# Patient Record
Sex: FEMALE | Race: WHITE | NOT HISPANIC OR LATINO | Employment: PART TIME | RURAL
[De-identification: names, ages, dates, MRNs, and addresses within clinical notes are randomized per-mention and may not be internally consistent; named-entity substitution may affect disease eponyms.]

---

## 2022-08-23 ENCOUNTER — OFFICE VISIT (OUTPATIENT)
Dept: FAMILY MEDICINE CLINIC | Facility: CLINIC | Age: 22
End: 2022-08-23

## 2022-08-23 VITALS
BODY MASS INDEX: 26.29 KG/M2 | SYSTOLIC BLOOD PRESSURE: 138 MMHG | WEIGHT: 154 LBS | TEMPERATURE: 98.4 F | HEIGHT: 64 IN | DIASTOLIC BLOOD PRESSURE: 70 MMHG | OXYGEN SATURATION: 98 % | HEART RATE: 92 BPM

## 2022-08-23 DIAGNOSIS — Z11.3 SCREENING EXAMINATION FOR STD (SEXUALLY TRANSMITTED DISEASE): ICD-10-CM

## 2022-08-23 DIAGNOSIS — R30.0 DYSURIA: Primary | ICD-10-CM

## 2022-08-23 LAB
B-HCG UR QL: NEGATIVE
BILIRUB BLD-MCNC: ABNORMAL MG/DL
CLARITY, POC: ABNORMAL
COLOR UR: ABNORMAL
EXPIRATION DATE: ABNORMAL
EXPIRATION DATE: NORMAL
GLUCOSE UR STRIP-MCNC: NEGATIVE MG/DL
INTERNAL NEGATIVE CONTROL: NORMAL
INTERNAL POSITIVE CONTROL: NORMAL
KETONES UR QL: NEGATIVE
LEUKOCYTE EST, POC: ABNORMAL
Lab: ABNORMAL
Lab: NORMAL
NITRITE UR-MCNC: NEGATIVE MG/ML
PH UR: 6 [PH] (ref 5–8)
PROT UR STRIP-MCNC: ABNORMAL MG/DL
RBC # UR STRIP: ABNORMAL /UL
SP GR UR: 1.02 (ref 1–1.03)
UROBILINOGEN UR QL: ABNORMAL

## 2022-08-23 PROCEDURE — 81025 URINE PREGNANCY TEST: CPT | Performed by: PHYSICIAN ASSISTANT

## 2022-08-23 PROCEDURE — 81003 URINALYSIS AUTO W/O SCOPE: CPT | Performed by: PHYSICIAN ASSISTANT

## 2022-08-23 PROCEDURE — 99213 OFFICE O/P EST LOW 20 MIN: CPT | Performed by: PHYSICIAN ASSISTANT

## 2022-08-23 RX ORDER — CEFDINIR 300 MG/1
300 CAPSULE ORAL 2 TIMES DAILY
Qty: 14 CAPSULE | Refills: 0 | Status: SHIPPED | OUTPATIENT
Start: 2022-08-23 | End: 2022-08-30

## 2022-08-23 RX ORDER — SULFAMETHOXAZOLE AND TRIMETHOPRIM 800; 160 MG/1; MG/1
1 TABLET ORAL 2 TIMES DAILY
COMMUNITY
Start: 2022-08-19 | End: 2022-08-23 | Stop reason: ALTCHOICE

## 2022-08-23 RX ORDER — DROSPIRENONE AND ETHINYL ESTRADIOL 0.02-3(28)
1 KIT ORAL DAILY
COMMUNITY
Start: 2022-06-09

## 2022-08-23 NOTE — PROGRESS NOTES
"Chief Complaint  Urinary Tract Infection (Had a UTI since last Tuesday hurts when urinates gets dizzy and having back pain went to [facility] on Friday got antibiotic bactrim but symptoms are not getting better)    Subjective          Thelma Lau presents to [facility]  Patient in today for evaluation on dysuria that she has had for the last 4 to 5 days.  She did go to the urgent care and was started on Bactrim for a urinary tract infection.  Yesterday was having some mild nausea, no vomiting or diarrhea.  States also off-and-on has had some dizziness and lower back pain as well.  States her last menstrual cycle was 2 weeks ago and normal.    Urinary Tract Infection   The current episode started in the past 7 days. The problem has been waxing and waning. There has been no fever. Associated symptoms include nausea. Pertinent negatives include no flank pain, frequency, hematuria or vomiting.       Objective   Vital Signs:   /70 (BP Location: Left arm, Patient Position: Sitting, Cuff Size: Adult)   Pulse 92   Temp 98.4 °F (36.9 °C) (Oral)   Ht 162.6 cm (64\")   Wt 69.9 kg (154 lb)   SpO2 98%   BMI 26.43 kg/m²     Body mass index is 26.43 kg/m².    Review of Systems   Constitutional: Negative for fatigue and fever.   HENT: Negative for sore throat.    Cardiovascular: Negative for chest pain.   Gastrointestinal: Positive for nausea. Negative for abdominal pain and vomiting.   Genitourinary: Positive for dysuria. Negative for flank pain, frequency and hematuria.   Neurological: Positive for dizziness and headache.       Past History:  Medical History: has no past medical history on file.   Surgical History: has no past surgical history on file.   Family History: family history is not on file.   Social History: reports that she has never smoked. She has never used smokeless tobacco. She reports current alcohol use. She reports that she does not use drugs.      Current Outpatient " Medications:   •  drospirenone-ethinyl estradiol (BARRINGTON,GIANVI) 3-0.02 MG per tablet, Take 1 tablet by mouth Daily., Disp: , Rfl:   •  cefdinir (OMNICEF) 300 MG capsule, Take 1 capsule by mouth 2 (Two) Times a Day for 7 days., Disp: 14 capsule, Rfl: 0  Allergies: Patient has no known allergies.    Physical Exam  Constitutional:       Appearance: Normal appearance.   Cardiovascular:      Rate and Rhythm: Normal rate and regular rhythm.      Heart sounds: Normal heart sounds.   Pulmonary:      Effort: Pulmonary effort is normal.      Breath sounds: Normal breath sounds.   Abdominal:      Palpations: Abdomen is soft.      Tenderness: There is no abdominal tenderness. There is no right CVA tenderness, left CVA tenderness, guarding or rebound.   Neurological:      Mental Status: She is oriented to person, place, and time.   Psychiatric:         Mood and Affect: Mood normal.         Behavior: Behavior normal.             Assessment and Plan   Diagnoses and all orders for this visit:    1. Dysuria (Primary)  -     POC Urinalysis Dipstick, Automated  -     Urine Culture - Urine, Urine, Clean Catch  -     Chlamydia trachomatis, Neisseria gonorrhoeae, Trichomonas vaginalis, PCR - Urine, Urine, Clean Catch; Future  -     POC Pregnancy, Urine  -     Chlamydia trachomatis, Neisseria gonorrhoeae, Trichomonas vaginalis, PCR - Urine, Urine, Clean Catch  Will send urine culture. Will d/c the bactrim and switch antibiotics today. Will send urine STD testing. Encouraged good hydration. RTC or to ER for any worsening symptoms if needed.   2. Screening examination for STD (sexually transmitted disease)   Other orders  -     cefdinir (OMNICEF) 300 MG capsule; Take 1 capsule by mouth 2 (Two) Times a Day for 7 days.  Dispense: 14 capsule; Refill: 0            Follow Up   No follow-ups on file.  Patient was given instructions and counseling regarding her condition or for health maintenance advice. Please see specific information pulled into  the AVS if appropriate.     Rachna Iyer PA-C

## 2022-08-25 ENCOUNTER — TELEPHONE (OUTPATIENT)
Dept: FAMILY MEDICINE CLINIC | Facility: CLINIC | Age: 22
End: 2022-08-25

## 2022-08-25 LAB
C TRACH RRNA SPEC QL NAA+PROBE: NEGATIVE
N GONORRHOEA RRNA SPEC QL NAA+PROBE: NEGATIVE
T VAGINALIS RRNA SPEC QL NAA+PROBE: NEGATIVE

## 2022-08-26 LAB
BACTERIA UR CULT: ABNORMAL
BACTERIA UR CULT: ABNORMAL
OTHER ANTIBIOTIC SUSC ISLT: ABNORMAL

## 2022-08-26 NOTE — TELEPHONE ENCOUNTER
Please let know preliminary report  does show bacterial growth- they have not sent back final report to make sure antibiotic she is on will cover-- will call when avail. Thanks.

## 2023-06-02 ENCOUNTER — OFFICE VISIT (OUTPATIENT)
Dept: FAMILY MEDICINE CLINIC | Facility: CLINIC | Age: 23
End: 2023-06-02

## 2023-06-02 VITALS
DIASTOLIC BLOOD PRESSURE: 62 MMHG | RESPIRATION RATE: 16 BRPM | TEMPERATURE: 98.6 F | HEART RATE: 89 BPM | BODY MASS INDEX: 30.56 KG/M2 | HEIGHT: 64 IN | SYSTOLIC BLOOD PRESSURE: 110 MMHG | WEIGHT: 179 LBS

## 2023-06-02 DIAGNOSIS — J06.9 UPPER RESPIRATORY TRACT INFECTION, UNSPECIFIED TYPE: Primary | ICD-10-CM

## 2023-06-02 DIAGNOSIS — J02.9 SORE THROAT: ICD-10-CM

## 2023-06-02 LAB
EXPIRATION DATE: NORMAL
EXPIRATION DATE: NORMAL
FLUAV AG UPPER RESP QL IA.RAPID: NOT DETECTED
FLUBV AG UPPER RESP QL IA.RAPID: NOT DETECTED
INTERNAL CONTROL: NORMAL
INTERNAL CONTROL: NORMAL
Lab: NORMAL
Lab: NORMAL
S PYO AG THROAT QL: NEGATIVE
SARS-COV-2 AG UPPER RESP QL IA.RAPID: NOT DETECTED

## 2023-06-02 PROCEDURE — 87880 STREP A ASSAY W/OPTIC: CPT | Performed by: NURSE PRACTITIONER

## 2023-06-02 PROCEDURE — 87428 SARSCOV & INF VIR A&B AG IA: CPT | Performed by: NURSE PRACTITIONER

## 2023-06-02 RX ORDER — BROMPHENIRAMINE MALEATE, PSEUDOEPHEDRINE HYDROCHLORIDE, AND DEXTROMETHORPHAN HYDROBROMIDE 2; 30; 10 MG/5ML; MG/5ML; MG/5ML
10 SYRUP ORAL 4 TIMES DAILY PRN
Qty: 200 ML | Refills: 0 | Status: SHIPPED | OUTPATIENT
Start: 2023-06-02

## 2023-06-02 NOTE — PROGRESS NOTES
"Chief Complaint  Sore Throat (Drainage.. headache.. )    Subjective          Thelma Lau presents to [facility]  History of Present Illness  Pt has had sore throat and congestion since Monday. She had feverishness and body aches on Wednesday. She denies N/V/D. She denies known sick contacts.       Objective   Vital Signs:   /62   Pulse 89   Temp 98.6 °F (37 °C) (Infrared)   Resp 16   Ht 162.6 cm (64.02\")   Wt 81.2 kg (179 lb)   BMI 30.71 kg/m²     Body mass index is 30.71 kg/m².            Current Outpatient Medications:   •  drospirenone-ethinyl estradiol (BARRINGTON,GIANVI) 3-0.02 MG per tablet, Take 1 tablet by mouth Daily., Disp: , Rfl:   •  brompheniramine-pseudoephedrine-DM 30-2-10 MG/5ML syrup, Take 10 mL by mouth 4 (Four) Times a Day As Needed for Congestion or Cough., Disp: 200 mL, Rfl: 0      Allergies: Patient has no known allergies.    Physical Exam     Result Review :                   Assessment and Plan    Diagnoses and all orders for this visit:    1. Upper respiratory tract infection, unspecified type (Primary)  Comments:  RSS, flu/covid negative. Increase fluids. Bromfed DM as needed. Tylenol/Motrin as needed. RTC for worsened sx and if not improving in 1 week.   Orders:  -     POCT SARS-CoV-2 Antigen NILSA + Flu  -     POCT rapid strep A  -     brompheniramine-pseudoephedrine-DM 30-2-10 MG/5ML syrup; Take 10 mL by mouth 4 (Four) Times a Day As Needed for Congestion or Cough.  Dispense: 200 mL; Refill: 0                Follow Up   Return in about 1 week (around 6/9/2023) for if not improving or sooner if symptoms worsen.  Patient was given instructions and counseling regarding her condition or for health maintenance advice. Please see specific information pulled into the AVS if appropriate.     Karrie Sanchez, HERIBERTO  "

## 2023-06-06 LAB
BACTERIA SPEC RESP CULT: NORMAL
BACTERIA SPEC RESP CULT: NORMAL

## 2023-08-31 ENCOUNTER — TELEPHONE (OUTPATIENT)
Dept: FAMILY MEDICINE CLINIC | Facility: CLINIC | Age: 23
End: 2023-08-31

## 2023-08-31 NOTE — TELEPHONE ENCOUNTER
Caller: MONICA PARRA    Relationship to patient: Mother    Best call back number: [phone]    Chief complaint: DIZZINESS, HEADACHE, FEVER    Type of visit: OFFICE VISIT     Requested date: 9/1/23

## 2023-09-18 ENCOUNTER — TELEPHONE (OUTPATIENT)
Dept: FAMILY MEDICINE CLINIC | Facility: CLINIC | Age: 23
End: 2023-09-18

## 2023-09-18 NOTE — TELEPHONE ENCOUNTER
Caller: Thelma Lau    Relationship: Self    Best call back number: [phone]    What is the best time to reach you: ANY    Who are you requesting to speak with (clinical staff, provider,  specific staff member):     CLINICAL    What was the call regarding:     PRIVATE MATTER - PATIENT GOES IN TO WORK AT 4:00 PM.  GETS  OFF10:00 PM     Is it okay if the provider responds through MyChart: NO

## 2024-01-18 ENCOUNTER — OFFICE VISIT (OUTPATIENT)
Dept: FAMILY MEDICINE CLINIC | Facility: CLINIC | Age: 24
End: 2024-01-18
Payer: COMMERCIAL

## 2024-01-18 VITALS
HEART RATE: 83 BPM | OXYGEN SATURATION: 100 % | BODY MASS INDEX: 31.76 KG/M2 | SYSTOLIC BLOOD PRESSURE: 102 MMHG | HEIGHT: 64 IN | DIASTOLIC BLOOD PRESSURE: 72 MMHG | WEIGHT: 186 LBS | TEMPERATURE: 97.7 F

## 2024-01-18 DIAGNOSIS — J30.81 ALLERGIC RHINITIS DUE TO ANIMAL HAIR AND DANDER: Primary | ICD-10-CM

## 2024-01-18 PROCEDURE — 99213 OFFICE O/P EST LOW 20 MIN: CPT | Performed by: NURSE PRACTITIONER

## 2024-01-18 RX ORDER — FLUTICASONE PROPIONATE 50 MCG
2 SPRAY, SUSPENSION (ML) NASAL DAILY
Qty: 16 G | Refills: 11 | Status: SHIPPED | OUTPATIENT
Start: 2024-01-18

## 2024-01-18 RX ORDER — LEVOCETIRIZINE DIHYDROCHLORIDE 5 MG/1
5 TABLET, FILM COATED ORAL EVERY EVENING
Qty: 90 TABLET | Refills: 3 | Status: SHIPPED | OUTPATIENT
Start: 2024-01-18

## 2024-01-18 NOTE — PROGRESS NOTES
"Chief Complaint  Allergies (X 3-4 months. Sneezing every 3-5mins, runny nose. Happening around dog at work. )    Subjective          Thelma Lau presents to [facility]  History of Present Illness  Pt has had sneezing, runny nose, and congestion for the past few months. She denies sinus pressure or cough. She just started Xyzal last week which seems to be helping a bit. Her symptoms started after getting a new lab puppy.   Allergies  Pertinent negatives include no chest pain, coughing, fatigue, fever or sore throat.       Objective   Vital Signs:   /72   Pulse 83   Temp 97.7 °F (36.5 °C) (Oral)   Ht 162.6 cm (64\")   Wt 84.4 kg (186 lb)   SpO2 100%   BMI 31.93 kg/m²     Body mass index is 31.93 kg/m².    Review of Systems   Constitutional:  Negative for fatigue and fever.   HENT:  Positive for rhinorrhea and sneezing. Negative for sinus pressure and sore throat.    Respiratory:  Negative for cough and shortness of breath.    Cardiovascular:  Negative for chest pain, palpitations and leg swelling.   Neurological:  Negative for syncope.   Psychiatric/Behavioral:  The patient is not nervous/anxious.           Current Outpatient Medications:     drospirenone-ethinyl estradiol (BARRINGTON,GIANVI) 3-0.02 MG per tablet, Take 1 tablet by mouth Daily., Disp: , Rfl:     fluticasone (FLONASE) 50 MCG/ACT nasal spray, 2 sprays into the nostril(s) as directed by provider Daily., Disp: 16 g, Rfl: 11    levocetirizine (XYZAL) 5 MG tablet, Take 1 tablet by mouth Every Evening., Disp: 90 tablet, Rfl: 3      Allergies: Patient has no known allergies.    Physical Exam  Constitutional:       Appearance: Normal appearance.   HENT:      Head: Normocephalic.   Eyes:      Conjunctiva/sclera: Conjunctivae normal.      Pupils: Pupils are equal, round, and reactive to light.   Cardiovascular:      Rate and Rhythm: Normal rate and regular rhythm.      Heart sounds: Normal heart sounds.   Pulmonary:      " Effort: Pulmonary effort is normal.      Breath sounds: Normal breath sounds.   Abdominal:      Tenderness: There is no abdominal tenderness.   Musculoskeletal:         General: Normal range of motion.   Skin:     General: Skin is warm and dry.      Capillary Refill: Capillary refill takes less than 2 seconds.   Neurological:      General: No focal deficit present.      Mental Status: She is alert and oriented to person, place, and time.   Psychiatric:         Mood and Affect: Mood normal.         Behavior: Behavior normal.         Thought Content: Thought content normal.         Judgment: Judgment normal.          Result Review :                   Assessment and Plan    Diagnoses and all orders for this visit:    1. Allergic rhinitis due to animal hair and dander (Primary)  Comments:  Xyzal and add Flonase. Saline nasal spray. RTC for worsened sx and if not improving in 1 week.  Orders:  -     levocetirizine (XYZAL) 5 MG tablet; Take 1 tablet by mouth Every Evening.  Dispense: 90 tablet; Refill: 3  -     fluticasone (FLONASE) 50 MCG/ACT nasal spray; 2 sprays into the nostril(s) as directed by provider Daily.  Dispense: 16 g; Refill: 11                Follow Up   No follow-ups on file.  Patient was given instructions and counseling regarding her condition or for health maintenance advice. Please see specific information pulled into the AVS if appropriate.     HERIBERTO [place]

## 2024-01-26 RX ORDER — MONTELUKAST SODIUM 10 MG/1
10 TABLET ORAL NIGHTLY
Qty: 90 TABLET | Refills: 3 | Status: SHIPPED | OUTPATIENT
Start: 2024-01-26

## 2024-03-02 ENCOUNTER — OFFICE VISIT (OUTPATIENT)
Dept: FAMILY MEDICINE CLINIC | Facility: CLINIC | Age: 24
End: 2024-03-02
Payer: COMMERCIAL

## 2024-03-02 VITALS
WEIGHT: 179.7 LBS | OXYGEN SATURATION: 96 % | TEMPERATURE: 98.3 F | SYSTOLIC BLOOD PRESSURE: 136 MMHG | DIASTOLIC BLOOD PRESSURE: 82 MMHG | HEIGHT: 64 IN | HEART RATE: 96 BPM | BODY MASS INDEX: 30.68 KG/M2

## 2024-03-02 DIAGNOSIS — Z86.59 HISTORY OF BEHAVIORAL AND MENTAL HEALTH PROBLEMS: ICD-10-CM

## 2024-03-02 DIAGNOSIS — J10.1 INFLUENZA B: Primary | ICD-10-CM

## 2024-03-02 DIAGNOSIS — R05.9 COUGH, UNSPECIFIED TYPE: ICD-10-CM

## 2024-03-02 DIAGNOSIS — H65.03 NON-RECURRENT ACUTE SEROUS OTITIS MEDIA OF BOTH EARS: ICD-10-CM

## 2024-03-02 LAB
EXPIRATION DATE: ABNORMAL
FLUAV AG UPPER RESP QL IA.RAPID: NOT DETECTED
FLUBV AG UPPER RESP QL IA.RAPID: DETECTED
INTERNAL CONTROL: ABNORMAL
Lab: ABNORMAL
SARS-COV-2 AG UPPER RESP QL IA.RAPID: NOT DETECTED

## 2024-03-02 RX ORDER — BROMPHENIRAMINE MALEATE, PSEUDOEPHEDRINE HYDROCHLORIDE, AND DEXTROMETHORPHAN HYDROBROMIDE 2; 30; 10 MG/5ML; MG/5ML; MG/5ML
5 SYRUP ORAL 4 TIMES DAILY PRN
Qty: 473 ML | Refills: 1 | Status: SHIPPED | OUTPATIENT
Start: 2024-03-02

## 2024-03-02 RX ORDER — OSELTAMIVIR PHOSPHATE 75 MG/1
75 CAPSULE ORAL 2 TIMES DAILY
Qty: 10 CAPSULE | Refills: 0 | Status: SHIPPED | OUTPATIENT
Start: 2024-03-02

## 2024-03-02 NOTE — ASSESSMENT & PLAN NOTE
Patient reports she has attempted to schedule an appointment with a behavioral health specialist, but was told she needs a referral from her PCP. She has not had an annual exam in well over a year. I recommended she schedule to come back next week for an annual exam with labs and we will complete the referral at that time. Patient agrees and will schedule before she leaves today.

## 2024-03-02 NOTE — ASSESSMENT & PLAN NOTE
Symptoms started 3 days ago with chills and sweats on Wednesday. Then, Thursday started with cough, congestion, headache, sore throat, and ears feel really full. She took a dose of Nyquil last night but otherwise no medications for treatment management    Influenza B positive in office.

## 2024-03-02 NOTE — ASSESSMENT & PLAN NOTE
"Patient reports fullness feeling in both ears, occasionally hears a \"popping\" or \"crackling\" sound. She denies pain, denies fever, denies drainage from either ear.    Exam shows fluid behind TMs bilaterally  "

## 2024-03-02 NOTE — PROGRESS NOTES
"    Office Note     Name: Thelma Lau    : 2000     MRN: [id number]     Chief Complaint  Cough (Pt complains of cough, congestion, drainage and sore throat. ) and Earache (C/o right ear. )    Subjective     History of Present Illness:  Thelma Lau is a 23 y.o. female who presents today for cough, congestion, sore throat, headache, and ears feel full.         Objective     No past medical history on file.  No past surgical history on file.  No family history on file.    Vital Signs  /82   Pulse 96   Temp 98.3 °F (36.8 °C) (Oral)   Ht 162.6 cm (64\")   Wt 81.5 kg (179 lb 11.2 oz)   SpO2 96%   BMI 30.85 kg/m²   Estimated body mass index is 30.85 kg/m² as calculated from the following:    Height as of this encounter: 162.6 cm (64\").    Weight as of this encounter: 81.5 kg (179 lb 11.2 oz).    Physical Exam  Vitals reviewed.   Constitutional:       Appearance: Normal appearance.   HENT:      Head: Normocephalic.      Right Ear: Ear canal and external ear normal. A middle ear effusion is present.      Left Ear: Ear canal and external ear normal. A middle ear effusion is present.      Nose: Congestion present.      Mouth/Throat:      Mouth: Mucous membranes are moist.      Pharynx: Posterior oropharyngeal erythema (mild) present.   Eyes:      Extraocular Movements: Extraocular movements intact.      Pupils: Pupils are equal, round, and reactive to light.   Cardiovascular:      Rate and Rhythm: Normal rate and regular rhythm.      Heart sounds: Normal heart sounds.   Pulmonary:      Effort: Pulmonary effort is normal.      Breath sounds: Normal breath sounds.   Abdominal:      General: Bowel sounds are normal.      Palpations: Abdomen is soft.   Musculoskeletal:         General: Normal range of motion.      Cervical back: Normal range of motion.   Skin:     General: Skin is warm and dry.      Capillary Refill: Capillary refill takes less than 2 seconds.   Neurological:      General: No focal deficit " "present.      Mental Status: She is alert and oriented to person, place, and time.   Psychiatric:         Mood and Affect: Mood normal.         Behavior: Behavior normal.                   POCT Results (if applicable):  Results for orders placed or performed in visit on 03/02/24   POCT SARS-CoV-2 + Flu Antigen NILSA    Specimen: Swab   Result Value Ref Range    SARS Antigen Not Detected Not Detected, Presumptive Negative    Influenza A Antigen NILSA Not Detected Not Detected    Influenza B Antigen NILSA Detected (A) Not Detected    Internal Control Passed Passed    Lot Number [id number]     Expiration Date 01/17/2025             Assessment and Plan     Diagnoses and all orders for this visit:    1. Influenza B (Primary)  Assessment & Plan:  Symptoms started 3 days ago with chills and sweats on Wednesday. Then, Thursday started with cough, congestion, headache, sore throat, and ears feel really full. She took a dose of Nyquil last night but otherwise no medications for treatment management    Influenza B positive in office.    Orders:  -     oseltamivir (Tamiflu) 75 MG capsule; Take 1 capsule by mouth 2 (Two) Times a Day.  Dispense: 10 capsule; Refill: 0    2. Cough, unspecified type  Assessment & Plan:  Patient reports she has nonproductive cough for the past few days. Reports it is not negatively affecting her sleep at this time. She has taken one dose of Nyquil, would like a different cough mediction    Orders:  -     brompheniramine-pseudoephedrine-DM 30-2-10 MG/5ML syrup; Take 5 mL by mouth 4 (Four) Times a Day As Needed for Allergies.  Dispense: 473 mL; Refill: 1  -     POCT SARS-CoV-2 + Flu Antigen NILSA    3. Non-recurrent acute serous otitis media of both ears  Assessment & Plan:  Patient reports fullness feeling in both ears, occasionally hears a \"popping\" or \"crackling\" sound. She denies pain, denies fever, denies drainage from either ear.    Exam shows fluid behind TMs bilaterally      4. History of behavioral and " mental health problems  Assessment & Plan:  Patient reports she has attempted to schedule an appointment with a behavioral health specialist, but was told she needs a referral from her PCP. She has not had an annual exam in well over a year. I recommended she schedule to come back next week for an annual exam with labs and we will complete the referral at that time. Patient agrees and will schedule before she leaves today.         BMI is >= 30 and <35. (Class 1 Obesity). The following options were offered after discussion;: exercise counseling/recommendations and nutrition counseling/recommendations          Follow Up  Return in about 4 days (around 3/6/2024) for Annual physical.    Cherelle Barker, APRN

## 2024-03-02 NOTE — ASSESSMENT & PLAN NOTE
Patient reports she has nonproductive cough for the past few days. Reports it is not negatively affecting her sleep at this time. She has taken one dose of Nyquil, would like a different cough mediction

## 2024-03-06 ENCOUNTER — OFFICE VISIT (OUTPATIENT)
Dept: FAMILY MEDICINE CLINIC | Facility: CLINIC | Age: 24
End: 2024-03-06
Payer: COMMERCIAL

## 2024-03-06 VITALS
DIASTOLIC BLOOD PRESSURE: 80 MMHG | OXYGEN SATURATION: 98 % | SYSTOLIC BLOOD PRESSURE: 122 MMHG | HEART RATE: 77 BPM | BODY MASS INDEX: 29.82 KG/M2 | HEIGHT: 65 IN | WEIGHT: 179 LBS

## 2024-03-06 DIAGNOSIS — Z13.21 ENCOUNTER FOR VITAMIN DEFICIENCY SCREENING: ICD-10-CM

## 2024-03-06 DIAGNOSIS — Z13.1 SCREENING FOR DIABETES MELLITUS (DM): ICD-10-CM

## 2024-03-06 DIAGNOSIS — Z13.220 ENCOUNTER FOR SCREENING FOR LIPID DISORDER: ICD-10-CM

## 2024-03-06 DIAGNOSIS — F41.9 ANXIETY: ICD-10-CM

## 2024-03-06 DIAGNOSIS — Z00.00 ANNUAL PHYSICAL EXAM: Primary | ICD-10-CM

## 2024-03-06 NOTE — PROGRESS NOTES
"    Annual Physical     Name: Thelma Lau    : 2000     MRN: [id number]     Chief Complaint  Annual Exam (Pt is here today for an annual physical ), Anxiety (Pt is wanting referral for therapist, Pt states she has trouble with anxiety and past trauma ), and Establish Care    Subjective     History of Present Illness:  Thelma Lau is a 23 y.o. female who presents today for a health maintenance evaluation.    Social History  Tobacco Use: Low Risk  (3/6/2024)    Patient History     Smoking Tobacco Use: Never     Smokeless Tobacco Use: Never     Passive Exposure: Not on file     Social History     Socioeconomic History    Marital status: Single   Tobacco Use    Smoking status: Never    Smokeless tobacco: Never   Vaping Use    Vaping status: Never Used   Substance and Sexual Activity    Alcohol use: Yes     Comment: occ    Drug use: Never    Sexual activity: Defer       General History  Thelma  does have regular dental visits.  She does complain of vision problems. 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 Last eye exam was 2023.  Immunizations are not up to date. The patient needs the following immunizations: declines vaccine    Lifestyle  Thelma  consumes in general, a \"healthy\" diet  .  She exercises daily.    Reproductive Health  Thelma  is premenopausal.  She reports periods are regular every 28-30 days.  She is sexually active. Her contraceptive plan is oral contraceptives (estrogen/progesterone).    Screening  Last pap was 2023, normal  Last Completed Pap Smear       This patient has no relevant Health Maintenance data.        . History of abnormal pap smear or family history of gyn cancer: none    Last mammogram was never  Last Completed Mammogram       This patient has no relevant Health Maintenance data.        . Personal or family history of abnormal mammograms or breast cancer: maternal great aunt with breast CA, age 60's    Last colonoscopy was " never  Last Completed Colonoscopy       This patient has no relevant Health Maintenance data.        . Family history of colon cancer: maternal grandfather with colon CA, was cause of death, caused in late 50's    Last DEXA was never.    Health Maintenance Summary            Overdue - TDAP/TD VACCINES (2 - Td or Tdap) Overdue since 7/29/2020 07/29/2010  Imm Admin: Tdap              Overdue - HEPATITIS C SCREENING (Once) Never done      No completion, postpone, or frequency change history exists for this topic.              Overdue - ANNUAL PHYSICAL (Yearly) Never done      No completion, postpone, or frequency change history exists for this topic.              Overdue - PAP SMEAR (Every 3 Years) Never done      No completion, postpone, or frequency change history exists for this topic.              Overdue - INFLUENZA VACCINE (Yearly - August to March) Overdue since 8/1/2023 11/13/2018  Imm Admin: Influenza, Unspecified    11/13/2018  Imm Admin: FluLaval/Fluzone >6mos    02/01/2017  Imm Admin: FluLaval/Fluzone >6mos    01/16/2013  Imm Admin: Influenza Quad Vaccine (Inpatient)    11/17/2011  Imm Admin: Influenza LAIV (Nasal)    Only the first 5 history entries have been loaded, but more history exists.              Overdue - COVID-19 Vaccine (1 - 2023-24 season) Never done      No completion, postpone, or frequency change history exists for this topic.              BMI FOLLOWUP (Yearly) Next due on 3/2/2025      03/02/2024  [employer]: WORKFLOW - QUALITY MEASUREMENT - DOCUMENTED WEIGHT FOLLOW-UP PLAN              Pneumococcal Vaccine 0-64 (Series Information) Aged Out      03/22/2001  Imm Admin: Pneumococcal, Unspecified    03/22/2001  Imm Admin: PEDS-Pneumococcal Conjugate (PCV7)    02/22/2001  Imm Admin: Pneumococcal, Unspecified    01/10/2001  Imm Admin: Pneumococcal Conjugate 13-Valent (PCV13)    01/10/2001  Imm Admin: PEDS-Pneumococcal Conjugate (PCV7)    Only the first 5 history entries have been  loaded, but more history exists.              HPV VACCINES (Series Information) Completed      05/27/2022  Imm Admin: Hpv9    08/03/2011  Imm Admin: HPV Quadrivalent    09/29/2010  Imm Admin: Hpv9    09/29/2010  Imm Admin: HPV Quadrivalent    08/03/2010  Imm Admin: HPV Quadrivalent    Only the first 5 history entries have been loaded, but more history exists.              Discontinued - CHLAMYDIA SCREENING  Discontinued        Frequency changed to Never automatically (Topic No Longer Applies)    08/23/2022  Chlamydia trachomatis, MARU component of Chlamydia trachomatis, Neisseria gonorrhoeae, Trichomonas vaginalis, PCR - Urine, Urine, Clean Catch                  Immunization History   Administered Date(s) Administered    DTaP 2000, 01/10/2001, 02/22/2001, 08/26/2002, 07/30/2004    DTaP / HiB / IPV 2000, 01/10/2001    Fluzone (or Fluarix & Flulaval for VFC) >6mos 02/01/2017, 11/13/2018    HPV Quadrivalent 07/29/2010, 08/03/2010, 09/29/2010, 08/03/2011    Hep A, 2 Dose 05/14/2015, 02/01/2017    Hep B, Adolescent or Pediatric 2000, 2000, 01/10/2001    Hepatitis A 05/14/2015, 02/01/2017    Hepatitis B Adult/Adolescent IM 2000, 2000, 01/10/2001    HiB 2000, 01/10/2001, 02/22/2001, 03/22/2001, 08/26/2002    Hib (PRP-OMP) 2000, 01/10/2001, 03/22/2001, 08/26/2002    Hpv9 09/29/2010, 05/27/2022    IPV 2000, 01/10/2001, 08/26/2002, 07/30/2004    Influenza LAIV (Nasal) 11/03/2008, 10/27/2009, 09/07/2010, 11/17/2011    Influenza Quad Vaccine (Inpatient) 01/16/2013    Influenza, Unspecified 11/13/2018    MMR 08/26/2002, 07/30/2004    Meningococcal B,(Bexsero) 09/10/2018, 11/13/2018    Meningococcal MCV4P (Menactra) 08/03/2011, 08/23/2016    Meningococcal Polysaccharide 08/03/2011    Meningococcal, Unspecified 08/23/2016    PEDS-Pneumococcal Conjugate (PCV7) 01/10/2001, 03/22/2001    Pneumococcal Conjugate 13-Valent (PCV13) 01/10/2001    Pneumococcal, Unspecified 02/22/2001,  "03/22/2001    Polio, Unspecified 07/30/2004    Tdap 07/29/2010    Varicella 08/26/2002       Review of Systems    Objective     Vital Signs  /80 (BP Location: Left arm, Patient Position: Sitting, Cuff Size: Adult)   Pulse 77   Ht 165.1 cm (65\")   Wt 81.2 kg (179 lb)   SpO2 98%   BMI 29.79 kg/m²   Estimated body mass index is 29.79 kg/m² as calculated from the following:    Height as of this encounter: 165.1 cm (65\").    Weight as of this encounter: 81.2 kg (179 lb).        Physical Exam  Vitals reviewed.   Constitutional:       Appearance: Normal appearance.   HENT:      Head: Normocephalic.      Right Ear: Tympanic membrane, ear canal and external ear normal.      Left Ear: Tympanic membrane, ear canal and external ear normal.      Nose: Nose normal.      Mouth/Throat:      Mouth: Mucous membranes are moist.      Pharynx: Posterior oropharyngeal erythema (minimal, improved from last exam) present.   Eyes:      Extraocular Movements: Extraocular movements intact.      Pupils: Pupils are equal, round, and reactive to light.   Cardiovascular:      Rate and Rhythm: Normal rate and regular rhythm.      Heart sounds: Normal heart sounds.   Pulmonary:      Effort: Pulmonary effort is normal.      Breath sounds: Normal breath sounds.   Abdominal:      General: Bowel sounds are normal.      Palpations: Abdomen is soft.   Musculoskeletal:         General: Normal range of motion.      Cervical back: Normal range of motion.   Skin:     General: Skin is warm and dry.      Capillary Refill: Capillary refill takes less than 2 seconds.   Neurological:      General: No focal deficit present.      Mental Status: She is alert and oriented to person, place, and time.   Psychiatric:         Mood and Affect: Mood normal.         Behavior: Behavior normal.          Assessment and Plan     Diagnoses and all orders for this visit:    1. Annual physical exam (Primary)  Assessment & Plan:  Overall doing well, recovering from " recent Influenza  Single, no children  Living in a house that she rents from her parents  Works at [employer] and [employer] at [employer] in [place]    Orders:  -     CBC & Differential  -     Comprehensive Metabolic Panel  -     Hemoglobin A1c  -     Lipid Panel  -     Vitamin B12  -     Vitamin D,25-Hydroxy  -     Folate  -     Thyroid Panel With TSH    2. Anxiety  Assessment & Plan:  Patient reports she has never been officially diagnosed with anxiety, nor has she even been on medication for it. She would like to initiate a referral to psychiatry today, interested in seeing a therapist/counselor    She reports her symptoms stem from her mother always doing a lot for her as a child - for example, it wasn't until recently that she started ordering her own food at a restaurant - she feels like is where she social anxiety started.     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    She also reports she has a hard time eating in front of other people, will often skip dinner dates and/or family gatherings if food and the expectation of eating a meal is involved. She reports she does not like for someone to watch her eat or comment of what or how much she is eating    Orders:  -     Ambulatory Referral to Psychiatry    3. Encounter for screening for lipid disorder  Assessment & Plan:  Labs drawn today - not fasting    Orders:  -     Lipid Panel    4. Screening for diabetes mellitus (DM)  Assessment & Plan:  Labs drawn today    Orders:  -     Comprehensive Metabolic Panel  -     Hemoglobin A1c    5. Encounter for vitamin deficiency screening  Assessment & Plan:  Labs drawn today    Orders:  -     Vitamin B12  -     Vitamin D,25-Hydroxy  -     Folate        Follow Up  Return in about 1 year (around 3/6/2025) for Annual  physical.    Cherelle Barker, APRN

## 2024-03-06 NOTE — ASSESSMENT & PLAN NOTE
Patient reports she has never been officially diagnosed with anxiety, nor has she even been on medication for it. She would like to initiate a referral to psychiatry today, interested in seeing a therapist/counselor    She reports her symptoms stem from her mother always doing a lot for her as a child - for example, it wasn't until recently that she started ordering her own food at a restaurant - she feels like is where she social anxiety started.     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    She also reports she has a hard time eating in front of other people, will often skip dinner dates and/or family gatherings if food and the expectation of eating a meal is involved. She reports she does not like for someone to watch her eat or comment of what or how much she is eating

## 2024-03-06 NOTE — ASSESSMENT & PLAN NOTE
Overall doing well, recovering from recent Influenza  Single, no children  Living in a house that she rents from her parents  Works at [email or web address] at [employer] in [place]

## 2024-03-07 PROBLEM — Z13.21 ENCOUNTER FOR VITAMIN DEFICIENCY SCREENING: Status: ACTIVE | Noted: 2024-03-07

## 2024-03-07 PROBLEM — Z13.220 ENCOUNTER FOR SCREENING FOR LIPID DISORDER: Status: ACTIVE | Noted: 2024-03-07

## 2024-03-07 PROBLEM — J10.1 INFLUENZA B: Status: RESOLVED | Noted: 2024-03-02 | Resolved: 2024-03-07

## 2024-03-07 PROBLEM — R05.9 COUGH: Status: RESOLVED | Noted: 2024-03-02 | Resolved: 2024-03-07

## 2024-03-07 PROBLEM — Z13.1 SCREENING FOR DIABETES MELLITUS (DM): Status: ACTIVE | Noted: 2024-03-07

## 2024-03-07 PROBLEM — H65.03 NON-RECURRENT ACUTE SEROUS OTITIS MEDIA OF BOTH EARS: Status: RESOLVED | Noted: 2024-03-02 | Resolved: 2024-03-07

## 2024-03-07 LAB
25(OH)D3+25(OH)D2 SERPL-MCNC: 68.4 NG/ML (ref 30–100)
ALBUMIN SERPL-MCNC: 4.3 G/DL (ref 4–5)
ALBUMIN/GLOB SERPL: 1.3 {RATIO} (ref 1.2–2.2)
ALP SERPL-CCNC: 75 IU/L (ref 44–121)
ALT SERPL-CCNC: 25 IU/L (ref 0–32)
AST SERPL-CCNC: 22 IU/L (ref 0–40)
BASOPHILS # BLD AUTO: 0 X10E3/UL (ref 0–0.2)
BASOPHILS NFR BLD AUTO: 1 %
BILIRUB SERPL-MCNC: <0.2 MG/DL (ref 0–1.2)
BUN SERPL-MCNC: 16 MG/DL (ref 6–20)
BUN/CREAT SERPL: 17 (ref 9–23)
CALCIUM SERPL-MCNC: 9.6 MG/DL (ref 8.7–10.2)
CHLORIDE SERPL-SCNC: 103 MMOL/L (ref 96–106)
CHOLEST SERPL-MCNC: 152 MG/DL (ref 100–199)
CO2 SERPL-SCNC: 25 MMOL/L (ref 20–29)
CREAT SERPL-MCNC: 0.94 MG/DL (ref 0.57–1)
EGFRCR SERPLBLD CKD-EPI 2021: 87 ML/MIN/1.73
EOSINOPHIL # BLD AUTO: 0.1 X10E3/UL (ref 0–0.4)
EOSINOPHIL NFR BLD AUTO: 2 %
ERYTHROCYTE [DISTWIDTH] IN BLOOD BY AUTOMATED COUNT: 12.6 % (ref 11.7–15.4)
FOLATE SERPL-MCNC: 17.1 NG/ML
FT4I SERPL CALC-MCNC: 2.3 (ref 1.2–4.9)
GLOBULIN SER CALC-MCNC: 3.4 G/DL (ref 1.5–4.5)
GLUCOSE SERPL-MCNC: 89 MG/DL (ref 70–99)
HBA1C MFR BLD: 5.1 % (ref 4.8–5.6)
HCT VFR BLD AUTO: 44.1 % (ref 34–46.6)
HDLC SERPL-MCNC: 51 MG/DL
HGB BLD-MCNC: 14.5 G/DL (ref 11.1–15.9)
IMM GRANULOCYTES # BLD AUTO: 0 X10E3/UL (ref 0–0.1)
IMM GRANULOCYTES NFR BLD AUTO: 0 %
LDLC SERPL CALC-MCNC: 76 MG/DL (ref 0–99)
LYMPHOCYTES # BLD AUTO: 2.2 X10E3/UL (ref 0.7–3.1)
LYMPHOCYTES NFR BLD AUTO: 42 %
MCH RBC QN AUTO: 26.9 PG (ref 26.6–33)
MCHC RBC AUTO-ENTMCNC: 32.9 G/DL (ref 31.5–35.7)
MCV RBC AUTO: 82 FL (ref 79–97)
MONOCYTES # BLD AUTO: 0.4 X10E3/UL (ref 0.1–0.9)
MONOCYTES NFR BLD AUTO: 8 %
NEUTROPHILS # BLD AUTO: 2.5 X10E3/UL (ref 1.4–7)
NEUTROPHILS NFR BLD AUTO: 47 %
PLATELET # BLD AUTO: 333 X10E3/UL (ref 150–450)
POTASSIUM SERPL-SCNC: 3.9 MMOL/L (ref 3.5–5.2)
PROT SERPL-MCNC: 7.7 G/DL (ref 6–8.5)
RBC # BLD AUTO: 5.39 X10E6/UL (ref 3.77–5.28)
SODIUM SERPL-SCNC: 141 MMOL/L (ref 134–144)
T3RU NFR SERPL: 22 % (ref 24–39)
T4 SERPL-MCNC: 10.5 UG/DL (ref 4.5–12)
TRIGL SERPL-MCNC: 143 MG/DL (ref 0–149)
TSH SERPL DL<=0.005 MIU/L-ACNC: 0.94 UIU/ML (ref 0.45–4.5)
VIT B12 SERPL-MCNC: 588 PG/ML (ref 232–1245)
VLDLC SERPL CALC-MCNC: 25 MG/DL (ref 5–40)
WBC # BLD AUTO: 5.2 X10E3/UL (ref 3.4–10.8)

## 2024-07-26 ENCOUNTER — OFFICE VISIT (OUTPATIENT)
Dept: FAMILY MEDICINE CLINIC | Facility: CLINIC | Age: 24
End: 2024-07-26
Payer: COMMERCIAL

## 2024-07-26 VITALS
DIASTOLIC BLOOD PRESSURE: 80 MMHG | BODY MASS INDEX: 29.79 KG/M2 | SYSTOLIC BLOOD PRESSURE: 102 MMHG | HEART RATE: 107 BPM | OXYGEN SATURATION: 98 % | HEIGHT: 65 IN | TEMPERATURE: 102 F

## 2024-07-26 DIAGNOSIS — R50.9 FEVER IN ADULT: Primary | ICD-10-CM

## 2024-07-26 PROCEDURE — 87428 SARSCOV & INF VIR A&B AG IA: CPT | Performed by: NURSE PRACTITIONER

## 2024-07-26 PROCEDURE — 99213 OFFICE O/P EST LOW 20 MIN: CPT | Performed by: NURSE PRACTITIONER

## 2024-07-26 PROCEDURE — 87880 STREP A ASSAY W/OPTIC: CPT | Performed by: NURSE PRACTITIONER

## 2024-07-26 NOTE — PROGRESS NOTES
"Chief Complaint  Chills (Sweats, chills, body ache, weakness. )    Subjective          Thelma Lau presents to [facility]  History of Present Illness  Pt began fever, chills, myalgias, and headaches this morning. She denies cough, congestion, or sore throat. She denies dysuria, abdominal pain, or N/V/D. She denies known sick contacts or tick bites.   Chills  Associated symptoms include chills, fatigue, a fever and headaches. Pertinent negatives include no abdominal pain, arthralgias, chest pain, congestion, coughing, nausea, sore throat or vomiting.       Objective   Vital Signs:   /80   Pulse 107   Temp (!) 102 °F (38.9 °C) (Oral)   Ht 165.1 cm (65\")   SpO2 98%   BMI 29.79 kg/m²     Body mass index is 29.79 kg/m².    Review of Systems   Constitutional:  Positive for chills, fatigue and fever.   HENT:  Negative for congestion, ear pain, sinus pressure and sore throat.    Respiratory:  Negative for cough and shortness of breath.    Cardiovascular:  Negative for chest pain, palpitations and leg swelling.   Gastrointestinal:  Negative for abdominal pain, diarrhea, nausea and vomiting.   Genitourinary:  Negative for dysuria.   Musculoskeletal:  Negative for arthralgias.   Neurological:  Negative for syncope and confusion.   Psychiatric/Behavioral:  The patient is not nervous/anxious.           Current Outpatient Medications:     drospirenone-ethinyl estradiol (BARRINGTON,GIANVI) 3-0.02 MG per tablet, Take 1 tablet by mouth Daily., Disp: , Rfl:     fluticasone (FLONASE) 50 MCG/ACT nasal spray, 2 sprays into the nostril(s) as directed by provider Daily., Disp: 16 g, Rfl: 11    levocetirizine (XYZAL) 5 MG tablet, Take 1 tablet by mouth Every Evening., Disp: 90 tablet, Rfl: 3    montelukast (Singulair) 10 MG tablet, Take 1 tablet by mouth Every Night., Disp: 90 tablet, Rfl: 3      Allergies: Patient has no known allergies.    Physical Exam  Constitutional:       Appearance: Normal " appearance.   HENT:      Right Ear: Tympanic membrane and ear canal normal.      Left Ear: Tympanic membrane and ear canal normal.      Nose: Nose normal.      Mouth/Throat:      Pharynx: No posterior oropharyngeal erythema.   Eyes:      Extraocular Movements: Extraocular movements intact.      Conjunctiva/sclera: Conjunctivae normal.      Pupils: Pupils are equal, round, and reactive to light.   Cardiovascular:      Rate and Rhythm: Normal rate and regular rhythm.      Heart sounds: Normal heart sounds.   Pulmonary:      Effort: Pulmonary effort is normal. No accessory muscle usage.      Breath sounds: Normal breath sounds.   Lymphadenopathy:      Cervical: No cervical adenopathy.   Skin:     General: Skin is warm and dry.   Neurological:      General: No focal deficit present.      Mental Status: She is alert.   Psychiatric:         Mood and Affect: Mood normal.         Behavior: Behavior normal.         Thought Content: Thought content normal.         Judgment: Judgment normal.          Result Review :                   Assessment and Plan    Diagnoses and all orders for this visit:    1. Fever in adult (Primary)  Comments:  Tylenol/Motrin PRN. Increase fluids. RTC if not improving in 48 hours. Go to the ER for severe headache.  Orders:  -     POCT SARS-CoV-2 Antigen NILSA + Flu  -     POCT rapid strep A  -     Throat / Upper Respiratory Culture - Swab, Throat                Follow Up   Return in about 2 days (around 7/28/2024) for if not improving or sooner if symptoms worsen.  Patient was given instructions and counseling regarding her condition or for health maintenance advice. Please see specific information pulled into the AVS if appropriate.     HERIBERTO Hills

## 2024-07-30 LAB
BACTERIA SPEC RESP CULT: NORMAL
BACTERIA SPEC RESP CULT: NORMAL

## 2025-01-09 ENCOUNTER — TELEMEDICINE (OUTPATIENT)
Dept: PSYCHIATRY | Facility: CLINIC | Age: 25
End: 2025-01-09
Payer: COMMERCIAL

## 2025-01-09 DIAGNOSIS — F43.23 ADJUSTMENT DISORDER WITH MIXED ANXIETY AND DEPRESSED MOOD: Primary | ICD-10-CM
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
## 2025-01-29 ENCOUNTER — TELEMEDICINE (OUTPATIENT)
Dept: PSYCHIATRY | Facility: CLINIC | Age: 25
End: 2025-01-29
Payer: COMMERCIAL

## 2025-01-29 DIAGNOSIS — F43.23 ADJUSTMENT DISORDER WITH MIXED ANXIETY AND DEPRESSED MOOD: Primary | ICD-10-CM

## 2025-01-29 NOTE — PROGRESS NOTES
"[facility]   Follow-up Progress Note     Date: 2025  Time In:9:03 AM EST   Time Out: 1003 EST      PROGRESS NOTE    DATA:  Thelma Lau is a 24 y.o. female presenting to [facility] (through [facility]), [address] [facility], [address] using a secure Yotpohart Video Visit through [employer] for assessment with Brooklyn Burks LCSW. Patient is being seen remotely via telehealth at home address in [place] and stated they are in a secure environment for this session using [facility] My Chart. This provider is located at [facility], [address] [place] [address], [facility] [place], using a secure Yotpohart Video Visit through [employer].  The patient consents to be seen remotely and when consent is given they understand that the consent allows for patient identifiable information to be sent to a third party as needed. They may refuse to be seen remotely at any time. The electronic data is encrypted and password protected,  patient or  legal guardian has been advised of the potential risks to privacy not withstanding such measures.     PT Identifiers used: Name and .    Thelma Lau is a 24 y.o. female who presents today for follow-up visit.    Mode of Visit: Video  Location of patient: -HOME-  Location of provider: +HOME+  You have chosen to receive care through a telehealth visit.  The patient has signed the video visit consent form.  The visit included audio and video interaction. No technical issues occurred during this visit.      Chief Complaint:   Chief Complaint   Patient presents with    Depression    Anxiety        Data: Patient arrived on time and participated actively in session today.      Pt reported she has been okay but this week has been \"rough\". She stated nothing has \"really happened\" this week but she feels she is breaking down more. She reported she has been getting upset about " "her ex. 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 She reported she was intimidated at first but the highlights looked good. Pt reported she found out right before [place] that her ex got engaged after breaking up for only two months. She stated she had felt like she was okay but finds occasionally she will \"be in the dumps\". She reported her ex went back to his ex from high school. She stated she has been exploring her attachment style and his attachment style.Discussed primary and secondary grief losses from break up.    Assisted patient in processing above session content; acknowledged and normalized patient’s thoughts, feelings, and concerns.  Rationalized patient thought process regarding recent stressors and life events. Discussed triggers associated with patient's emotions. Also discussed coping skills for patient to implement. Discussed radical acceptance and self-care.    Clinical Maneuvering/Intervention:  Allowed Patient to freely discuss issues  without interruption or judgement with unconditional positive regard, active listening skills, and empathy. Therapist provided a safe, confidential environment to facilitate the development of a positive therapeutic relationship and encouraged open, honest communication. Assisted Patient in identifying risk factors which would indicate the need for higher level of care including thoughts to harm self or others and/or self-harming behavior and encouraged Patient to contact this office, call 911, or present to the nearest emergency room should any of these events occur. Discussed crisis intervention services and means to access. Patient adamantly and convincingly denies current suicidal or homicidal ideation or perceptual disturbance. Assisted Patient in processing session content; acknowledged and normalized Patient’s thoughts, feelings, and concerns by utilizing a person-centered approach in efforts " to build appropriate rapport and a positive therapeutic relationship with open and honest communication. Therapist utilized dialectical behavior techniques to teach and model emotional regulation and relaxation methods. Therapist assisted Patient with identifying and implementing healthier coping strategies.     ASSESSMENT:    Patient appears to maintain relative stability as compared to their baseline.  Patient also appears to be experiencing heightened anxiety and depression in response to COVID-19 epidemic and continues to struggle with depression and anxiety, which continues to cause impairment in important areas of functioning.  Patient is receptive to assistance with maintaining a stable lifestyle and participates in the therapeutic process. As a result, they can be reasonably expected to continue to benefit from treatment and would likely be at increased risk for decompensation otherwise.       PHQ-Score Total:  PHQ-9 Total Score:       ANDRIA-7 Score Total:           MENTAL STATUS EXAM   General Appearance:  Cleanly groomed and dressed  Eye Contact:  Good eye contact  Attitude:  Cooperative  Motor Activity:  Normal gait, posture  Speech:  Normal rate, tone, volume  Mood and affect:  Normal, pleasant and appropriate  Hopelessness:  Denies  Loneliness: Denies  Thought Process:  Logical and goal-directed  Associations/ Thought Content:  No delusions  Hallucinations:  None  Suicidal Ideations:  Not present  Homicidal Ideation:  Not present  Sensorium:  Alert and clear  Orientation:  Person, place and time  Immediate Recall, Recent, and Remote Memory:  Intact  Attention Span/ Concentration:  Good  Fund of Knowledge:  Appropriate for age and educational level  Insight:  Good  Judgement:  Good  Reliability:  Good  Impulse Control:  Good        Functional Status: Mild impairment     Progress toward goal: Not at goal    Prognosis: Good with Ongoing Treatment            Impression/Formulation:    VISIT DIAGNOSIS:     ICD-10-CM ICD-9-CM   1. Adjustment disorder with mixed anxiety and depressed mood  F43.23 309.28        Patient appeared alert and oriented.  Patient is voluntarily requesting to continue outpatient therapy at [facility].  Patient is receptive to assistance with maintaining a stable lifestyle.  Patient presents with the above diagnoses.  Patient is agreeable to attend routine therapy sessions.  Patient expressed desire to maintain stability and participate in the therapeutic process.     Plan:   Patient will continue in individual outpatient therapy with focus on improved functioning and coping skills, maintaining stability, and avoiding decompensation and the need for higher level of care.    Patient will adhere to medication regimen as prescribed and report any side effects.    Patient will contact this office ([facility] at [phone]), call 911 or present to the nearest emergency room should suicidal or homicidal ideations occur. Therapist will continue to provide Cognitive Behavioral Therapy, Solution Focused Therapy and Dialectical Behavioral Therapy, as well as other modalities as needed, to improve functioning, maintain stability, and avoid decompensation and the need for higher level of care.       Return in about 2 weeks (around 2/12/2025) for Video visit., or earlier if symptoms worsen or fail to improve.    Crisis Plan:  Symptoms and/or behaviors to indicate a crisis: Excessive worry or fear, Feeling sad or low, Prolonged irritability or anger, Isolation, Lack of sleep, and Thinking about suicide    What calming techniques or other strategies will patient use to de-esclate and stay safe: slow down, breathe, visualize calming self, think it though, listen to music, change focus, take a walk, talk with someone, use distraction techniques, exercise, use grounding techniques,     Who is one person patient can contact to assist with  de-escalation? parents    If symptoms/behaviors persist, Patient will present to the nearest hospital for an assessment. Advised patient of [facility] ER and assessment services.     Recommended Referrals: none at this time.          This document has been electronically signed by Brooklyn Burks LCSW  January 29, 2025 10:05 EST     Part of this note may be an electronic transcription/translation of spoken language to printed text using the Dragon Dictation System.

## 2025-02-12 ENCOUNTER — TELEMEDICINE (OUTPATIENT)
Dept: PSYCHIATRY | Facility: CLINIC | Age: 25
End: 2025-02-12
Payer: COMMERCIAL

## 2025-02-12 DIAGNOSIS — F43.23 ADJUSTMENT DISORDER WITH MIXED ANXIETY AND DEPRESSED MOOD: Primary | ICD-10-CM

## 2025-02-12 NOTE — PROGRESS NOTES
"[facility]   Follow-up Progress Note     Date: 2025  Time In:9:07 AM EST   Time Out: 1007 EST      PROGRESS NOTE    DATA:  Thelma Lau is a 24 y.o. female presenting to [facility] (through [facility]), [address], [address] using a secure [employer]hart Video Visit through [employer] for assessment with Brooklyn Bukrs LCSW. Patient is being seen remotely via telehealth at home address in [place] and stated they are in a secure environment for this session using [facility] My Chart. This provider is located at [facility], [address] [place] [address], [facility], using a secure [employer] ([employer]hart Video Visit through [employer].  The patient consents to be seen remotely and when consent is given they understand that the consent allows for patient identifiable information to be sent to a third party as needed. They may refuse to be seen remotely at any time. The electronic data is encrypted and password protected,  patient or  legal guardian has been advised of the potential risks to privacy not withstanding such measures.     PT Identifiers used: Name and .    Thelma Lau is a 24 y.o. female who presents today for follow-up visit.    Mode of Visit: Video  Location of patient: -WORK-  Location of provider: +HOME+  You have chosen to receive care through a telehealth visit.  The patient has signed the video visit consent form.  The visit included audio and video interaction. No technical issues occurred during this visit.      Chief Complaint:   Chief Complaint   Patient presents with    Depression    Anxiety        Data: Patient arrived on time and participated actively in session today.      Pt reported she has still been \"rollercoasting through emotions\". She stated she went to eat at a restaurant she went to a lot with her ex and the  asked about her and him. She reported she " found out her ex got  last Saturday. 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  Discussed statements made by ex that affected her self-worth and trust in future relationships.    Assisted patient in processing above session content; acknowledged and normalized patient’s thoughts, feelings, and concerns.  Rationalized patient thought process regarding recent stressors and life events. Discussed triggers associated with patient's emotions. Also discussed coping skills for patient to implement. Discussed radical acceptance and mindfulness.    Clinical Maneuvering/Intervention:  Allowed Patient to freely discuss issues  without interruption or judgement with unconditional positive regard, active listening skills, and empathy. Therapist provided a safe, confidential environment to facilitate the development of a positive therapeutic relationship and encouraged open, honest communication. Assisted Patient in identifying risk factors which would indicate the need for higher level of care including thoughts to harm self or others and/or self-harming behavior and encouraged Patient to contact this office, call 911, or present to the nearest emergency room should any of these events occur. Discussed crisis intervention services and means to access. Patient adamantly and convincingly denies current suicidal or homicidal ideation or perceptual disturbance. Assisted Patient in processing session content; acknowledged and normalized Patient’s thoughts, feelings, and concerns by utilizing a person-centered approach in efforts to build appropriate rapport and a positive therapeutic relationship with open and honest communication. Therapist utilized dialectical behavior techniques to teach and model emotional regulation and relaxation  methods. Therapist assisted Patient with identifying and implementing healthier coping strategies.     ASSESSMENT:    Patient appears to maintain relative stability as compared to their baseline.  Patient also appears to be experiencing heightened anxiety and depression in response to COVID-19 epidemic and continues to struggle with depression and anxiety, which continues to cause impairment in important areas of functioning.  Patient is receptive to assistance with maintaining a stable lifestyle and participates in the therapeutic process. As a result, they can be reasonably expected to continue to benefit from treatment and would likely be at increased risk for decompensation otherwise.       PHQ-Score Total:  PHQ-9 Total Score:       ANDRIA-7 Score Total:           MENTAL STATUS EXAM   General Appearance:  Cleanly groomed and dressed  Eye Contact:  Good eye contact  Attitude:  Cooperative  Motor Activity:  Normal gait, posture  Speech:  Normal rate, tone, volume  Mood and affect:  Normal, pleasant and appropriate  Hopelessness:  Denies  Loneliness: Denies  Thought Process:  Logical and goal-directed  Associations/ Thought Content:  No delusions  Hallucinations:  None  Suicidal Ideations:  Not present  Homicidal Ideation:  Not present  Sensorium:  Alert and clear  Orientation:  Person, place and time  Immediate Recall, Recent, and Remote Memory:  Intact  Attention Span/ Concentration:  Good  Fund of Knowledge:  Appropriate for age and educational level  Insight:  Good  Judgement:  Good  Reliability:  Good  Impulse Control:  Good        Functional Status: Moderate impairment     Progress toward goal: Not at goal    Prognosis: Good with Ongoing Treatment            Impression/Formulation:    VISIT DIAGNOSIS:    ICD-10-CM ICD-9-CM   1. Adjustment disorder with mixed anxiety and depressed mood  F43.23 309.28        Patient appeared alert and oriented.  Patient is voluntarily requesting to continue outpatient therapy at  [facility].  Patient is receptive to assistance with maintaining a stable lifestyle.  Patient presents with the above diagnoses.  Patient is agreeable to attend routine therapy sessions.  Patient expressed desire to maintain stability and participate in the therapeutic process.     Plan:   Patient will continue in individual outpatient therapy with focus on improved functioning and coping skills, maintaining stability, and avoiding decompensation and the need for higher level of care.    Patient will adhere to medication regimen as prescribed and report any side effects.    Patient will contact this office ([facility] at [phone]), call 911 or present to the nearest emergency room should suicidal or homicidal ideations occur. Therapist will continue to provide Cognitive Behavioral Therapy, Solution Focused Therapy and Dialectical Behavioral Therapy, as well as other modalities as needed, to improve functioning, maintain stability, and avoid decompensation and the need for higher level of care.      Return in about 2 weeks (around 2/26/2025) for Video visit., or earlier if symptoms worsen or fail to improve.    Crisis Plan:  Symptoms and/or behaviors to indicate a crisis: Excessive worry or fear, Feeling sad or low, Prolonged irritability or anger, Isolation, Lack of sleep, and Thinking about suicide    What calming techniques or other strategies will patient use to de-esclate and stay safe: slow down, breathe, visualize calming self, think it though, listen to music, change focus, take a walk, talk with someone, use distraction techniques, exercise, use grounding techniques,     Who is one person patient can contact to assist with de-escalation? friend    If symptoms/behaviors persist, Patient will present to the nearest hospital for an assessment. Advised patient of [facility] ER and assessment services.     Recommended Referrals: none at this  time.          This document has been electronically signed by Brooklyn Burks LCSW  February 12, 2025 10:07 EST     Part of this note may be an electronic transcription/translation of spoken language to printed text using the Dragon Dictation System.

## 2025-02-26 RX ORDER — MONTELUKAST SODIUM 10 MG/1
10 TABLET ORAL NIGHTLY
Qty: 90 TABLET | OUTPATIENT
Start: 2025-02-26

## 2025-02-26 RX ORDER — MONTELUKAST SODIUM 10 MG/1
10 TABLET ORAL NIGHTLY
Qty: 30 TABLET | Refills: 0 | Status: SHIPPED | OUTPATIENT
Start: 2025-02-26

## 2025-03-05 ENCOUNTER — TELEMEDICINE (OUTPATIENT)
Dept: PSYCHIATRY | Facility: CLINIC | Age: 25
End: 2025-03-05
Payer: COMMERCIAL

## 2025-03-05 DIAGNOSIS — F43.23 ADJUSTMENT DISORDER WITH MIXED ANXIETY AND DEPRESSED MOOD: Primary | ICD-10-CM

## 2025-03-05 NOTE — PROGRESS NOTES
"[facility]   Follow-up Progress Note     Date: 2025  Time In:10:07 AM EST   Time Out: 1104 EST      PROGRESS NOTE    DATA:  Thelma Lau is a 24 y.o. female presenting to [facility] (through [facility]), [address] [facility], [address] using a secure [employer] Video Visit through [employer] for assessment with Brooklyn Burks LCSW. Patient is being seen remotely via telehealth at home address in [place] and stated they are in a secure environment for this session using [facility] My Chart. This provider is located at [facility], [address] [place] [address], [facility], using a secure [employer] Video Visit through [employer].  The patient consents to be seen remotely and when consent is given they understand that the consent allows for patient identifiable information to be sent to a third party as needed. They may refuse to be seen remotely at any time. The electronic data is encrypted and password protected,  patient or  legal guardian has been advised of the potential risks to privacy not withstanding such measures.     PT Identifiers used: Name and .    Thelma Lau is a 24 y.o. female who presents today for follow-up visit.    Mode of Visit: Video  Location of patient: -HOME-  Location of provider: +HOME+  You have chosen to receive care through a telehealth visit.  The patient has signed the video visit consent form.  The visit included audio and video interaction. No technical issues occurred during this visit.      Chief Complaint:   Chief Complaint   Patient presents with    Depression    Anxiety        Data: Patient arrived on time and participated actively in session today.      Pt reported she has been feeling much better with her emotions again. 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 She reported it made her feel " better and she has told her friends that they are allowed to talk about it so she can process. She stated she feels like she is accepting more of the ending of the relationship and feels like she is getting to the point where things happen for a reason. She reported she has been hanging out with a morris who has a two year old daughter. She stated he has talked about her meeting his daughter and she is apprehensive. She was receptive to discussing it further with him so he does not think she is never interested in meeting his daughter but wants to take her time. She stated she has an opportunity to move to the same town he and she lived in and she is unsure if she wants to. Discussed how to manage her triggers instead of letting them managing her. Pt was receptive to thought blocking techniques when faced with intrusive thoughts.      Assisted patient in processing above session content; acknowledged and normalized patient’s thoughts, feelings, and concerns.  Rationalized patient thought process regarding recent stressors and life events. Discussed triggers associated with patient's emotions. Also discussed coping skills for patient to implement. Discussed radical acceptance and mindfulness.    Clinical Maneuvering/Intervention:  Allowed Patient to freely discuss issues  without interruption or judgement with unconditional positive regard, active listening skills, and empathy. Therapist provided a safe, confidential environment to facilitate the development of a positive therapeutic relationship and encouraged open, honest communication. Assisted Patient in identifying risk factors which would indicate the need for higher level of care including thoughts to harm self or others and/or self-harming behavior and encouraged Patient to contact this office, call 911, or present to the nearest emergency room should any of these events occur. Discussed crisis intervention services and means to access. Patient adamantly and  convincingly denies current suicidal or homicidal ideation or perceptual disturbance. Assisted Patient in processing session content; acknowledged and normalized Patient’s thoughts, feelings, and concerns by utilizing a person-centered approach in efforts to build appropriate rapport and a positive therapeutic relationship with open and honest communication. Therapist utilized dialectical behavior techniques to teach and model emotional regulation and relaxation methods. Therapist assisted Patient with identifying and implementing healthier coping strategies.     ASSESSMENT:    Patient appears to maintain relative stability as compared to their baseline.  Patient also appears to be experiencing heightened anxiety and depression in response to COVID-19 epidemic and continues to struggle with depression and anxiety, which continues to cause impairment in important areas of functioning.  Patient is receptive to assistance with maintaining a stable lifestyle and participates in the therapeutic process. As a result, they can be reasonably expected to continue to benefit from treatment and would likely be at increased risk for decompensation otherwise.       PHQ-Score Total:  PHQ-9 Total Score:       ANDRIA-7 Score Total:           MENTAL STATUS EXAM   General Appearance:  Cleanly groomed and dressed  Eye Contact:  Good eye contact  Attitude:  Cooperative  Motor Activity:  Normal gait, posture  Speech:  Normal rate, tone, volume  Mood and affect:  Normal, pleasant and appropriate  Hopelessness:  Denies  Loneliness: Denies  Thought Process:  Logical and goal-directed  Associations/ Thought Content:  No delusions  Hallucinations:  None  Suicidal Ideations:  Not present  Homicidal Ideation:  Not present  Sensorium:  Alert and clear  Orientation:  Person, place and time  Immediate Recall, Recent, and Remote Memory:  Intact  Attention Span/ Concentration:  Good  Fund of Knowledge:  Appropriate for age and educational  level  Insight:  Good  Judgement:  Good  Reliability:  Good  Impulse Control:  Good        Functional Status: Mild impairment     Progress toward goal: Not at goal    Prognosis: Good with Ongoing Treatment            Impression/Formulation:    VISIT DIAGNOSIS:    ICD-10-CM ICD-9-CM   1. Adjustment disorder with mixed anxiety and depressed mood  F43.23 309.28        Patient appeared alert and oriented.  Patient is voluntarily requesting to continue outpatient therapy at [facility].  Patient is receptive to assistance with maintaining a stable lifestyle.  Patient presents with the above diagnoses.  Patient is agreeable to attend routine therapy sessions.  Patient expressed desire to maintain stability and participate in the therapeutic process.     Plan:   Patient will continue in individual outpatient therapy with focus on improved functioning and coping skills, maintaining stability, and avoiding decompensation and the need for higher level of care.    Patient will adhere to medication regimen as prescribed and report any side effects.    Patient will contact this office ([facility] at [phone]), call 911 or present to the nearest emergency room should suicidal or homicidal ideations occur. Therapist will continue to provide Cognitive Behavioral Therapy, Solution Focused Therapy and Dialectical Behavioral Therapy, as well as other modalities as needed, to improve functioning, maintain stability, and avoid decompensation and the need for higher level of care.       Return in about 1 week (around 3/12/2025) for Video visit., or earlier if symptoms worsen or fail to improve.    Crisis Plan:  Symptoms and/or behaviors to indicate a crisis: Excessive worry or fear, Feeling sad or low, Prolonged irritability or anger, Isolation, and Thinking about suicide    What calming techniques or other strategies will patient use to de-esclate and stay safe: slow  down, breathe, visualize calming self, think it though, listen to music, change focus, take a walk, talk with someone, use distraction techniques, exercise, use grounding techniques,     Who is one person patient can contact to assist with de-escalation? friends    If symptoms/behaviors persist, Patient will present to the nearest hospital for an assessment. Advised patient of [facility] ER and assessment services.     Recommended Referrals: none at this time.          This document has been electronically signed by Brooklyn Burks LCSW  March 5, 2025 11:05 EST     Part of this note may be an electronic transcription/translation of spoken language to printed text using the Dragon Dictation System.

## 2025-03-13 ENCOUNTER — TELEMEDICINE (OUTPATIENT)
Dept: PSYCHIATRY | Facility: CLINIC | Age: 25
End: 2025-03-13
Payer: COMMERCIAL

## 2025-03-13 DIAGNOSIS — F43.23 ADJUSTMENT DISORDER WITH MIXED ANXIETY AND DEPRESSED MOOD: Primary | ICD-10-CM

## 2025-03-13 NOTE — PROGRESS NOTES
[facility]   Follow-up Progress Note     Date: 2025  Time In:2:05 PM EDT   Time Out: 1503 EDT      PROGRESS NOTE    DATA:  Thelma Lau is a 24 y.o. female presenting to [facility] (through [facility]), [address] [facility], [address] using a secure WHILLhart Video Visit through [employer] for assessment with Brooklyn Burks LCSW. Patient is being seen remotely via telehealth at home address in [place] and stated they are in a secure environment for this session using [facility] My Chart. This provider is located at [facility], [address] [place] [address], [facility], using a secure WHILLhart Video Visit through [employer].  The patient consents to be seen remotely and when consent is given they understand that the consent allows for patient identifiable information to be sent to a third party as needed. They may refuse to be seen remotely at any time. The electronic data is encrypted and password protected,  patient or  legal guardian has been advised of the potential risks to privacy not withstanding such measures.     PT Identifiers used: Name and .    Thelma Lau is a 24 y.o. female who presents today for follow-up visit.    Mode of Visit: Video  Location of patient: -HOME-  Location of provider: +HOME+  You have chosen to receive care through a telehealth visit.  The patient has signed the video visit consent form.  The visit included audio and video interaction. No technical issues occurred during this visit.      Chief Complaint:   Chief Complaint   Patient presents with    Anxiety    Depression        Data: Patient arrived on time and participated actively in session today.      Pt reported she got her hair colored today. She stated she spoke to the girls she thought about moving in with and they want to look at 2 months from now. She reported she feels better about not having  to make a decision right away. She stated she thinks she may do this due to knowing it will not be a permanent decision and she could always move back. She reported speaking ot one of her friends about the morris she is talking to and her friend thinks she does not like him as much as he likes her. She stated she has pulled back from hanging out with him due to wanting to not lead him on. Allowed Pt to explore past relationships and how to avoid using others to distract self from healing. Pt reported utilizing thought blocking techniques discussed in previous session to interrupt her intrusive thoughts about ex. She stated it worked although she had to use it a few times that day. Discussed negative self talk and how to begin having more compassion for self.    Assisted patient in processing above session content; acknowledged and normalized patient’s thoughts, feelings, and concerns.  Rationalized patient thought process regarding recent stressors and life events. Discussed triggers associated with patient's emotions. Also discussed coping skills for patient to implement. Discussed radical acceptance and mindfulness.    Clinical Maneuvering/Intervention:  Allowed Patient to freely discuss issues  without interruption or judgement with unconditional positive regard, active listening skills, and empathy. Therapist provided a safe, confidential environment to facilitate the development of a positive therapeutic relationship and encouraged open, honest communication. Assisted Patient in identifying risk factors which would indicate the need for higher level of care including thoughts to harm self or others and/or self-harming behavior and encouraged Patient to contact this office, call 911, or present to the nearest emergency room should any of these events occur. Discussed crisis intervention services and means to access. Patient adamantly and convincingly denies current suicidal or homicidal ideation or perceptual  disturbance. Assisted Patient in processing session content; acknowledged and normalized Patient’s thoughts, feelings, and concerns by utilizing a person-centered approach in efforts to build appropriate rapport and a positive therapeutic relationship with open and honest communication. Therapist utilized dialectical behavior techniques to teach and model emotional regulation and relaxation methods. Therapist assisted Patient with identifying and implementing healthier coping strategies.     ASSESSMENT:    Patient appears to maintain relative stability as compared to their baseline.  Patient also appears to be experiencing heightened anxiety and depression in response to COVID-19 epidemic and continues to struggle with depression and anxiety, which continues to cause impairment in important areas of functioning.  Patient is receptive to assistance with maintaining a stable lifestyle and participates in the therapeutic process. As a result, they can be reasonably expected to continue to benefit from treatment and would likely be at increased risk for decompensation otherwise.       PHQ-Score Total:  PHQ-9 Total Score:       ANDRIA-7 Score Total:           MENTAL STATUS EXAM   General Appearance:  Cleanly groomed and dressed  Eye Contact:  Good eye contact  Attitude:  Cooperative  Motor Activity:  Normal gait, posture  Speech:  Normal rate, tone, volume  Mood and affect:  Normal, pleasant and appropriate  Hopelessness:  Denies  Loneliness: Denies  Thought Process:  Logical and goal-directed  Associations/ Thought Content:  No delusions  Hallucinations:  None  Suicidal Ideations:  Not present  Homicidal Ideation:  Not present  Sensorium:  Clear and alert  Orientation:  Person, time and place  Immediate Recall, Recent, and Remote Memory:  Intact  Attention Span/ Concentration:  Good  Fund of Knowledge:  Appropriate for age and educational level  Insight:  Good  Judgement:  Good  Reliability:  Good  Impulse Control:   Good        Functional Status: Mild impairment     Progress toward goal: Not at goal    Prognosis: Good with Ongoing Treatment            Impression/Formulation:    VISIT DIAGNOSIS:    ICD-10-CM ICD-9-CM   1. Adjustment disorder with mixed anxiety and depressed mood  F43.23 309.28        Patient appeared alert and oriented.  Patient is voluntarily requesting to continue outpatient therapy at [facility].  Patient is receptive to assistance with maintaining a stable lifestyle.  Patient presents with the above diagnoses.  Patient is agreeable to attend routine therapy sessions.  Patient expressed desire to maintain stability and participate in the therapeutic process.     Plan:   Patient will continue in individual outpatient therapy with focus on improved functioning and coping skills, maintaining stability, and avoiding decompensation and the need for higher level of care.    Patient will adhere to medication regimen as prescribed and report any side effects.    Patient will contact this office ([facility] at [phone]), call 911 or present to the nearest emergency room should suicidal or homicidal ideations occur. Therapist will continue to provide Cognitive Behavioral Therapy, Solution Focused Therapy and Dialectical Behavioral Therapy, as well as other modalities as needed, to improve functioning, maintain stability, and avoid decompensation and the need for higher level of care.     Return in about 2 weeks (around 3/27/2025) for Video visit., or earlier if symptoms worsen or fail to improve.    Crisis Plan:  Symptoms and/or behaviors to indicate a crisis: Excessive worry or fear, Feeling sad or low, Prolonged irritability or anger, Isolation, Lack of sleep, and Thinking about suicide    What calming techniques or other strategies will patient use to de-esclate and stay safe: slow down, breathe, visualize calming self, think it though, listen to  music, change focus, take a walk, talk with someone, use distraction techniques, exercise, use grounding techniques,     Who is one person patient can contact to assist with de-escalation? friend    If symptoms/behaviors persist, Patient will present to the nearest hospital for an assessment. Advised patient of [facility] ER and assessment services.     Recommended Referrals: none at this time.          This document has been electronically signed by Brooklyn Burks LCSW  March 13, 2025 15:04 EDT     Part of this note may be an electronic transcription/translation of spoken language to printed text using the Dragon Dictation System.

## 2025-03-27 ENCOUNTER — TELEMEDICINE (OUTPATIENT)
Dept: PSYCHIATRY | Facility: CLINIC | Age: 25
End: 2025-03-27
Payer: COMMERCIAL

## 2025-03-27 ENCOUNTER — OFFICE VISIT (OUTPATIENT)
Dept: FAMILY MEDICINE CLINIC | Facility: CLINIC | Age: 25
End: 2025-03-27
Payer: COMMERCIAL

## 2025-03-27 VITALS
SYSTOLIC BLOOD PRESSURE: 112 MMHG | RESPIRATION RATE: 16 BRPM | HEIGHT: 65 IN | OXYGEN SATURATION: 96 % | DIASTOLIC BLOOD PRESSURE: 78 MMHG | WEIGHT: 163.56 LBS | HEART RATE: 77 BPM | BODY MASS INDEX: 27.25 KG/M2

## 2025-03-27 DIAGNOSIS — F43.23 ADJUSTMENT DISORDER WITH MIXED ANXIETY AND DEPRESSED MOOD: Primary | ICD-10-CM

## 2025-03-27 DIAGNOSIS — R51.9 NONINTRACTABLE EPISODIC HEADACHE, UNSPECIFIED HEADACHE TYPE: ICD-10-CM

## 2025-03-27 DIAGNOSIS — J30.89 ENVIRONMENTAL AND SEASONAL ALLERGIES: ICD-10-CM

## 2025-03-27 DIAGNOSIS — Z00.00 ANNUAL PHYSICAL EXAM: Primary | ICD-10-CM

## 2025-03-27 PROCEDURE — 99395 PREV VISIT EST AGE 18-39: CPT | Performed by: NURSE PRACTITIONER

## 2025-03-27 RX ORDER — FLUTICASONE PROPIONATE 50 MCG
2 SPRAY, SUSPENSION (ML) NASAL DAILY
Qty: 16 G | Refills: 11 | Status: SHIPPED | OUTPATIENT
Start: 2025-03-27

## 2025-03-27 RX ORDER — LEVOCETIRIZINE DIHYDROCHLORIDE 5 MG/1
5 TABLET, FILM COATED ORAL EVERY EVENING
Qty: 90 TABLET | Refills: 3 | Status: SHIPPED | OUTPATIENT
Start: 2025-03-27

## 2025-03-27 RX ORDER — MONTELUKAST SODIUM 10 MG/1
10 TABLET ORAL NIGHTLY
Qty: 90 TABLET | Refills: 3 | Status: SHIPPED | OUTPATIENT
Start: 2025-03-27

## 2025-03-27 NOTE — PROGRESS NOTES
[facility]   Follow-up Progress Note     Date: 2025  Time In:2:04 PM EDT   Time Out: 1459 EDT      PROGRESS NOTE    DATA:  Thelma Lau is a 24 y.o. female presenting to [facility] (through [facility]), [address] [facility], [address] using a secure [email or web address] Video Visit through [employer] for assessment with Brooklyn Burks LCSW. Patient is being seen remotely via telehealth at home address in [place] and stated they are in a secure environment for this session using [facility] My Chart. This provider is located at [facility], [address] [place] [address], [facility], using a secure [email or web address] Video Visit through [employer]  The patient consents to be seen remotely and when consent is given they understand that the consent allows for patient identifiable information to be sent to a third party as needed. They may refuse to be seen remotely at any time. The electronic data is encrypted and password protected,  patient or  legal guardian has been advised of the potential risks to privacy not withstanding such measures.     PT Identifiers used: Name and .    Thelma Lau is a 24 y.o. female who presents today for follow-up visit.    Mode of Visit: Video  Location of patient: -HOME-  Location of provider: +HOME+  You have chosen to receive care through a telehealth visit.  The patient has signed the video visit consent form.  The visit included audio and video interaction. No technical issues occurred during this visit.      Chief Complaint:   Chief Complaint   Patient presents with    Depression    Anxiety        Data: Patient arrived on time and participated actively in session today.      Pt reported she is doing well. She stated she has to cancel next session due to having a work commitment at 8-4 next Wednesday. She reported school has been going well and she stays booked. She stated  she recently bought tickets to [place] for beginning of May. Discussed travel as a coping skill and how to use moderation to be able to accommodate travel plans. Pt reflected on traveling with ex and not having to pay but is looking forward to her own travel despite having to pay for it. She reported cutting her 3 year old nephews hair recently and it was her first time doing a clipper cut. She stated she did well and was proud of herself.       Assisted patient in processing above session content; acknowledged and normalized patient’s thoughts, feelings, and concerns.  Rationalized patient thought process regarding recent stressors and life events. Discussed triggers associated with patient's emotions. Also discussed coping skills for patient to implement. Discussed radical acceptance and distress tolerance.     Clinical Maneuvering/Intervention:  Allowed Patient to freely discuss issues  without interruption or judgement with unconditional positive regard, active listening skills, and empathy. Therapist provided a safe, confidential environment to facilitate the development of a positive therapeutic relationship and encouraged open, honest communication. Assisted Patient in identifying risk factors which would indicate the need for higher level of care including thoughts to harm self or others and/or self-harming behavior and encouraged Patient to contact this office, call 911, or present to the nearest emergency room should any of these events occur. Discussed crisis intervention services and means to access. Patient adamantly and convincingly denies current suicidal or homicidal ideation or perceptual disturbance. Assisted Patient in processing session content; acknowledged and normalized Patient’s thoughts, feelings, and concerns by utilizing a person-centered approach in efforts to build appropriate rapport and a positive therapeutic relationship with open and honest communication. Therapist utilized  dialectical behavior techniques to teach and model emotional regulation and relaxation methods. Therapist assisted Patient with identifying and implementing healthier coping strategies.     ASSESSMENT:    Patient appears to maintain relative stability as compared to their baseline.  Patient also appears to be experiencing heightened anxiety and depression in response to COVID-19 epidemic and continues to struggle with depression and anxiety, which continues to cause impairment in important areas of functioning.  Patient is receptive to assistance with maintaining a stable lifestyle and participates in the therapeutic process. As a result, they can be reasonably expected to continue to benefit from treatment and would likely be at increased risk for decompensation otherwise.       PHQ-Score Total:  PHQ-9 Total Score:       ANDRIA-7 Score Total:           MENTAL STATUS EXAM   General Appearance:  Cleanly groomed and dressed  Eye Contact:  Good eye contact  Attitude:  Cooperative  Motor Activity:  Normal gait, posture  Speech:  Normal rate, tone, volume  Mood and affect:  Normal, pleasant and appropriate  Hopelessness:  Denies  Loneliness: Denies  Thought Process:  Logical and goal-directed  Associations/ Thought Content:  No delusions  Hallucinations:  None  Suicidal Ideations:  Not present  Homicidal Ideation:  Not present  Sensorium:  Alert and clear  Orientation:  Person, place and time  Immediate Recall, Recent, and Remote Memory:  Intact  Attention Span/ Concentration:  Good  Fund of Knowledge:  Appropriate for age and educational level  Insight:  Good  Judgement:  Good  Reliability:  Good  Impulse Control:  Good        Functional Status: Mild impairment     Progress toward goal: Not at goal    Prognosis: Good with Ongoing Treatment            Impression/Formulation:    VISIT DIAGNOSIS:    ICD-10-CM ICD-9-CM   1. Adjustment disorder with mixed anxiety and depressed mood  F43.23 309.28        Patient appeared alert  and oriented.  Patient is voluntarily requesting to continue outpatient therapy at [facility].  Patient is receptive to assistance with maintaining a stable lifestyle.  Patient presents with the above diagnoses.  Patient is agreeable to attend routine therapy sessions.  Patient expressed desire to maintain stability and participate in the therapeutic process.     Plan:   Patient will continue in individual outpatient therapy with focus on improved functioning and coping skills, maintaining stability, and avoiding decompensation and the need for higher level of care.    Patient will adhere to medication regimen as prescribed and report any side effects.    Patient will contact this office ([facility] at [phone]), call 911 or present to the nearest emergency room should suicidal or homicidal ideations occur. Therapist will continue to provide Cognitive Behavioral Therapy, Solution Focused Therapy and Dialectical Behavioral Therapy, as well as other modalities as needed, to improve functioning, maintain stability, and avoid decompensation and the need for higher level of care.         Return in about 2 weeks (around 4/10/2025) for Video visit., or earlier if symptoms worsen or fail to improve.    Crisis Plan:  Symptoms and/or behaviors to indicate a crisis: Excessive worry or fear, Feeling sad or low, Prolonged irritability or anger, Isolation, Lack of sleep, and Thinking about suicide    What calming techniques or other strategies will patient use to de-esclate and stay safe: slow down, breathe, visualize calming self, think it though, listen to music, change focus, take a walk, talk with someone, use distraction techniques, exercise, use grounding techniques,     Who is one person patient can contact to assist with de-escalation? family    If symptoms/behaviors persist, Patient will present to the nearest hospital for an assessment. Advised patient  [facility] ER and assessment services.     Recommended Referrals: none at this time.          This document has been electronically signed by Brooklyn Burks LCSW  March 27, 2025 15:00 EDT     Part of this note may be an electronic transcription/translation of spoken language to printed text using the Dragon Dictation System.

## 2025-03-27 NOTE — PROGRESS NOTES
"    Office Note     Name: Thelma Lau    : 2000     MRN: [id number]     Chief Complaint  Annual Exam    Subjective     History of Present Illness:  Thelma Lau is a 24 y.o. female who presents today for annual physical.     History of Present Illness  The patient is a 24-year-old female who presents for an annual physical exam.    She has been experiencing daily headaches for the past 2 weeks, which she attributes to her demanding schedule of school from 8:30 AM to 4:30 PM, followed by work from 5:00 PM to 11:30 PM, resulting in late-night returns and reduced sleep. She also reports inadequate water intake, consuming only 2 Yeti cups daily instead of her usual 3 or 4. She recalls a single episode of lightheadedness while sitting, during which she experienced visual disturbances described as \"seeing stars.\"    She is under the care of a gynecologist at [facility] in [place], where she undergoes annual cervical examinations and Pap smears every 3 years. Her last tetanus booster was administered in . She has no children.    She has been managing her allergies with Singulair, Xyzal, and Flonase nasal spray. She recently refilled her Singulair prescription and has a 3-month supply remaining. She continues to take Xyzal but has exhausted her supply. She uses Flonase nasal spray approximately once a month, finding it beneficial for ear fluid management, but does not use it daily due to discomfort with nasal administration.    FAMILY HISTORY  Her great aunt had breast cancer. Her second cousin had multiple ovarian cysts and miscarriages. Her maternal grandfather had colorectal cancer. Her mother has had at least 2 colonoscopies, all of which have been okay.    MEDICATIONS  Current: Montelukast, Xyzal, Flonase nasal spray    IMMUNIZATIONS  She received a tetanus booster in .      Objective     Past Medical History:   Diagnosis Date    Cough 2024    Influenza B 2024    Non-recurrent acute " "serous otitis media of both ears 03/02/2024     History reviewed. No pertinent surgical history.  History reviewed. No pertinent family history.    Vital Signs  /78 (BP Location: Left arm, Patient Position: Sitting, Cuff Size: Adult)   Pulse 77   Resp 16   Ht 165.1 cm (65\")   Wt 74.2 kg (163 lb 9 oz)   SpO2 96%   BMI 27.22 kg/m²   Estimated body mass index is 27.22 kg/m² as calculated from the following:    Height as of this encounter: 165.1 cm (65\").    Weight as of this encounter: 74.2 kg (163 lb 9 oz).    Physical Exam  Vitals reviewed.   Constitutional:       Appearance: Normal appearance.   Cardiovascular:      Rate and Rhythm: Normal rate and regular rhythm.      Heart sounds: Normal heart sounds.   Pulmonary:      Effort: Pulmonary effort is normal.      Breath sounds: Normal breath sounds.   Skin:     General: Skin is warm and dry.   Neurological:      General: No focal deficit present.      Mental Status: She is alert and oriented to person, place, and time.   Psychiatric:         Mood and Affect: Mood normal.         Behavior: Behavior normal.        Physical Exam  Lungs were auscultated.  Heart was examined.    Results  Laboratory Studies  A1c was 5.1. Cholesterol levels were normal.       Assessment and Plan     Diagnoses and all orders for this visit:    1. Annual physical exam (Primary)    2. Environmental and seasonal allergies  -     montelukast (SINGULAIR) 10 MG tablet; Take 1 tablet by mouth Every Night.  Dispense: 90 tablet; Refill: 3  -     levocetirizine (XYZAL) 5 MG tablet; Take 1 tablet by mouth Every Evening.  Dispense: 90 tablet; Refill: 3  -     fluticasone (FLONASE) 50 MCG/ACT nasal spray; Administer 2 sprays into the nostril(s) as directed by provider Daily.  Dispense: 16 g; Refill: 11    3. Nonintractable episodic headache, unspecified headache type      Assessment & Plan  1. Health maintenance.  Her vitamin levels, A1c, and cholesterol levels were all within normal ranges " last year. She was advised to receive the influenza vaccine at the pharmacy due to the unavailability of age-appropriate doses in the office. The COVID-19 vaccine is also available at the pharmacy. She was informed about the importance of regular screenings for colorectal cancer starting at age 45, breast cancer at age 40, and cervical cancer at age 21. She was encouraged to report any changes in family history related to breast or colorectal cancer, as this could impact her screening schedule. She was also advised to report any new lumps or bumps in her breasts, nipple discharge, or gastrointestinal issues. The option of genetic testing for cancer genes was discussed, given her family history of various cancers. She declined the tetanus booster at this time.    2. Allergic rhinitis.  She is currently taking montelukast (Singulair) and Xyzal for allergy management. She was advised to continue these medications as long as she is tolerating them well. Prescriptions for Singulair and Xyzal were renewed and sent to [facility] in [place]. She uses Flonase nasal spray occasionally and does not require a refill at this time.    3. Headaches.  Her headaches could be attributed to lack of sleep, inadequate hydration, and the absence of allergy medication. She was advised to increase her water intake, resume her allergy medication, and attempt to maximize her sleep duration over the next week. If the headaches persist despite these interventions, she will inform us for further evaluation.      Follow Up  Return in about 1 year (around 3/27/2026) for Annual physical.      Patient or patient representative verbalized consent for the use of Ambient Listening during the visit with  HERIBERTO Carreon for chart documentation. 3/28/2025  09:32 EDT    HERIBERTO Carreon

## 2025-04-30 ENCOUNTER — TELEPHONE (OUTPATIENT)
Dept: PSYCHIATRY | Facility: CLINIC | Age: 25
End: 2025-04-30

## 2025-04-30 NOTE — TELEPHONE ENCOUNTER
Pt called stated she forgot appointment and signed in at 915. Pt was checking on next appointment date 5/7/25 am Educated on no show policy

## 2025-05-07 ENCOUNTER — TELEMEDICINE (OUTPATIENT)
Dept: PSYCHIATRY | Facility: CLINIC | Age: 25
End: 2025-05-07
Payer: COMMERCIAL

## 2025-05-07 DIAGNOSIS — F43.23 ADJUSTMENT DISORDER WITH MIXED ANXIETY AND DEPRESSED MOOD: Primary | ICD-10-CM
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
## 2025-05-07 NOTE — TREATMENT PLAN
Multi-Disciplinary Problems (from Behavioral Health Treatment Plan)      Active Problems       Problem: Adjustment  Start Date: 05/07/25      Problem Details: The patient self-scales this problem as a 6 with 10 being the worst.          Goal Priority Start Date Expected End Date End Date    Patient will implement healthy coping strategies. -- 05/07/25 11/05/25 --    Goal Details: Progress toward goal:  The patient self-scales their progress related to this goal as a 5 with 10 being the worst.        Goal Intervention Frequency Start Date End Date    Assist patient to identify and develop healthy coping strategies Weekly 05/07/25 --    Intervention Details: Duration of treatment until discharged.                        Reviewed By       Brooklyn Burks, ASTRID 05/07/25 1114    [facility] 05/07[facility]25 1114    Brooklyn Burks, [facility] 05/07/25 1114    Brooklyn Burks, [facility] 05/07/25 1113                     I have discussed and reviewed this treatment plan with the patient.

## 2025-05-08 ENCOUNTER — OFFICE VISIT (OUTPATIENT)
Dept: FAMILY MEDICINE CLINIC | Facility: CLINIC | Age: 25
End: 2025-05-08
Payer: COMMERCIAL

## 2025-05-08 VITALS
HEIGHT: 65 IN | OXYGEN SATURATION: 98 % | SYSTOLIC BLOOD PRESSURE: 108 MMHG | BODY MASS INDEX: 27.99 KG/M2 | HEART RATE: 74 BPM | DIASTOLIC BLOOD PRESSURE: 66 MMHG | WEIGHT: 168 LBS

## 2025-05-08 DIAGNOSIS — H65.03 NON-RECURRENT ACUTE SEROUS OTITIS MEDIA OF BOTH EARS: Primary | ICD-10-CM

## 2025-05-08 PROCEDURE — 99213 OFFICE O/P EST LOW 20 MIN: CPT | Performed by: PHYSICIAN ASSISTANT

## 2025-05-08 RX ORDER — PSEUDOEPHEDRINE HCL 120 MG/1
120 TABLET, FILM COATED, EXTENDED RELEASE ORAL EVERY 12 HOURS
Qty: 30 TABLET | Refills: 1 | Status: SHIPPED | OUTPATIENT
Start: 2025-05-08

## 2025-05-08 RX ORDER — PREDNISONE 20 MG/1
TABLET ORAL
Qty: 9 TABLET | Refills: 0 | Status: SHIPPED | OUTPATIENT
Start: 2025-05-08

## 2025-05-08 RX ORDER — IPRATROPIUM BROMIDE 21 UG/1
2 SPRAY, METERED NASAL EVERY 12 HOURS
Qty: 30 ML | Refills: 2 | Status: SHIPPED | OUTPATIENT
Start: 2025-05-08

## 2025-05-08 NOTE — PROGRESS NOTES
".Chief Complaint  Earache (Rt ear ache, lt ear fullness, flew over weekend and has been having ear issues since coming back. )    Subjective          History of Present Illness  Thelma Lau is here today with her  History of Present Illness  The patient presents for evaluation of ear pain.    She reports experiencing severe ear pain since her flight on Thursday, which was exacerbated upon her return from [place] on Sunday. She describes a sensation of congestion in her head, accompanied by an inability to equalize pressure in her ears, which she finds particularly distressing. The right ear appears to be more affected than the left. She has been producing mucus for approximately 1.5 weeks, originating from her chest, but does not report any postnasal drip. Additionally, she has been experiencing a persistent cough. She is currently on Xyzal, Flonase, and Singulair for allergies, but these symptoms are predominantly related to her ears. SOCIAL HISTORY  She smokes cigarettes.    Objective   Vital Signs:   /66   Pulse 74   Ht 165.1 cm (65\")   Wt 76.2 kg (168 lb)   SpO2 98%   BMI 27.96 kg/m²     Body mass index is 27.96 kg/m².  BMI is >= 25 and <30. (Overweight) The following options were offered after discussion;: information on healthy weight added to patient's after visit summary       Review of Systems      Current Outpatient Medications:     drospirenone-ethinyl estradiol (BARRINGTON,GIANVI) 3-0.02 MG per tablet, Take 1 tablet by mouth Daily., Disp: , Rfl:     fluticasone (FLONASE) 50 MCG/ACT nasal spray, Administer 2 sprays into the nostril(s) as directed by provider Daily., Disp: 16 g, Rfl: 11    levocetirizine (XYZAL) 5 MG tablet, Take 1 tablet by mouth Every Evening., Disp: 90 tablet, Rfl: 3    montelukast (SINGULAIR) 10 MG tablet, Take 1 tablet by mouth Every Night., Disp: 90 tablet, Rfl: 3    ipratropium (ATROVENT) 0.03 % nasal spray, Administer 2 sprays into the nostril(s) as directed by provider Every " 12 (Twelve) Hours., Disp: 30 mL, Rfl: 2    predniSONE (DELTASONE) 20 MG tablet, 2 tab po qd x 3 days then 1 tab po qd x 3 days, Disp: 9 tablet, Rfl: 0    pseudoephedrine (SUDAFED) 120 MG 12 hr tablet, Take 1 tablet by mouth Every 12 (Twelve) Hours., Disp: 30 tablet, Rfl: 1    Allergies: Patient has no known allergies.    Physical Exam  Vitals and nursing note reviewed.   Constitutional:       General: She is not in acute distress.     Appearance: Normal appearance. She is not ill-appearing, toxic-appearing or diaphoretic.   HENT:      Head: Normocephalic and atraumatic.      Right Ear: Ear canal and external ear normal. There is no impacted cerumen. Tympanic membrane is bulging. Tympanic membrane is not injected or erythematous.      Left Ear: Ear canal and external ear normal. There is no impacted cerumen. Tympanic membrane is bulging. Tympanic membrane is not injected or erythematous.      Nose: Congestion present.      Mouth/Throat:      Mouth: Mucous membranes are moist.      Comments: Clear post nasal drainage  Cardiovascular:      Rate and Rhythm: Normal rate and regular rhythm.      Heart sounds: Normal heart sounds.   Pulmonary:      Effort: Pulmonary effort is normal.      Breath sounds: Normal breath sounds.   Neurological:      General: No focal deficit present.      Mental Status: She is alert.   Psychiatric:         Mood and Affect: Mood normal.         Behavior: Behavior normal.        Physical Exam      Result Review :          Results               Assessment and Plan    Diagnoses and all orders for this visit:    1. Non-recurrent acute serous otitis media of both ears (Primary)      -     predniSONE (DELTASONE) 20 MG tablet; 2 tab po qd x 3 days then 1 tab po qd x 3 days  Dispense: 9 tablet; Refill: 0  -     pseudoephedrine (SUDAFED) 120 MG 12 hr tablet; Take 1 tablet by mouth Every 12 (Twelve) Hours.  Dispense: 30 tablet; Refill: 1  -     ipratropium (ATROVENT) 0.03 % nasal spray; Administer 2  sprays into the nostril(s) as directed by provider Every 12 (Twelve) Hours.  Dispense: 30 mL; Refill: 2      Assessment & Plan    - Fluid is present in both ears, but no infection is detected.  - Clear fluid indicates that antibiotics are not necessary.  - Prednisone prescribed to alleviate symptoms; Sudafed 12-hour recommended for a few days to expedite fluid removal.  - Ipratropium bromide nasal spray introduced to manage drainage, to be used 2 to 3 times daily for a week. Flonase and Xyzal may be continued as needed. A note for school will be provided. If the condition worsens or severe pain occurs, further evaluation is advised.      Follow Up   No follow-ups on file.  Patient was given instructions and counseling regarding her condition or for health maintenance advice. Please see specific information pulled into the AVS if appropriate.     Patient or patient representative verbalized consent for the use of Ambient Listening during the visit with  TIFFANY Vines for chart documentation. 5/8/2025  14:40 EDT    TIFFANY Vines  05/08/2025

## 2025-05-08 NOTE — LETTER
May 8, 2025     Patient: Thelma Lau   YOB: 2000   Date of Visit: 5/8/2025       To Whom it May Concern:    Thelma Lau was seen in my clinic on 5/8/2025. She  may return to school in one day.           Sincerely,          Earnestine Barba PA-C        CC: No Recipients

## 2025-05-19 ENCOUNTER — TELEPHONE (OUTPATIENT)
Dept: FAMILY MEDICINE CLINIC | Facility: CLINIC | Age: 25
End: 2025-05-19

## 2025-05-19 NOTE — TELEPHONE ENCOUNTER
Caller: Thelma Lau     Relationship: PATIENT    Best call back number: [phone]    What is your medical concern? BOTH EARS STILL HAVE FLUID BEHIND THEM.    How long has this issue been going on? 5/8/25 AT LAST VISIT.    Is your provider already aware of this issue? NOT THAT IT'S CONTINUED    Have you been treated for this issue? YES, WITH STEROIDS. FINISHED THOSE. STILL HAVING NASAL SPRAY AND SUDAFED.  PATIENT STATED TOO BUSY FOR AN APPOINTMENT.  PLEASE CALL HER TO ADVISE WHAT ELSE SHE CAN TRY OR SEND A NEW NEW TO:    [id number] [address] AT [facility] [address] - [phone]  - [phone]  [phone]      THANK YOU

## 2025-05-20 DIAGNOSIS — H65.06 RECURRENT ACUTE SEROUS OTITIS MEDIA OF BOTH EARS: Primary | ICD-10-CM

## 2025-05-20 NOTE — TELEPHONE ENCOUNTER
Please call the patient and let her know that she has taken all the medication I have to throw at her. I can refer her to ENT if she would like

## 2025-05-21 ENCOUNTER — TELEMEDICINE (OUTPATIENT)
Dept: PSYCHIATRY | Facility: CLINIC | Age: 25
End: 2025-05-21
Payer: COMMERCIAL

## 2025-05-21 DIAGNOSIS — F43.23 ADJUSTMENT DISORDER WITH MIXED ANXIETY AND DEPRESSED MOOD: Primary | ICD-10-CM
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
## 2025-06-18 ENCOUNTER — TELEPHONE (OUTPATIENT)
Dept: PSYCHIATRY | Facility: CLINIC | Age: 25
End: 2025-06-18
Payer: COMMERCIAL

## 2025-06-18 NOTE — TELEPHONE ENCOUNTER
Pt called stated she wanted see about being refer to In Person Services. States she likes therapist but feels she needs In Person rather than virtual.     Please Advise

## 2025-06-19 NOTE — TELEPHONE ENCOUNTER
Sent a referral to pt's local [facility] behavioral health clinic and cancelled her upcoming appts. Attempted to contact patient on phone but was unable to reach her. Thank you!